# Patient Record
Sex: FEMALE | Race: WHITE | NOT HISPANIC OR LATINO | Employment: OTHER | URBAN - METROPOLITAN AREA
[De-identification: names, ages, dates, MRNs, and addresses within clinical notes are randomized per-mention and may not be internally consistent; named-entity substitution may affect disease eponyms.]

---

## 2017-01-12 ENCOUNTER — HOSPITAL ENCOUNTER (OUTPATIENT)
Facility: MEDICAL CENTER | Age: 62
End: 2017-01-12
Attending: FAMILY MEDICINE
Payer: COMMERCIAL

## 2017-01-12 ENCOUNTER — OFFICE VISIT (OUTPATIENT)
Dept: MEDICAL GROUP | Facility: PHYSICIAN GROUP | Age: 62
End: 2017-01-12
Payer: COMMERCIAL

## 2017-01-12 VITALS
HEIGHT: 67 IN | HEART RATE: 78 BPM | DIASTOLIC BLOOD PRESSURE: 68 MMHG | RESPIRATION RATE: 16 BRPM | SYSTOLIC BLOOD PRESSURE: 130 MMHG | TEMPERATURE: 98.6 F | BODY MASS INDEX: 36.41 KG/M2 | WEIGHT: 232 LBS | OXYGEN SATURATION: 95 %

## 2017-01-12 DIAGNOSIS — R10.9 RIGHT FLANK PAIN: ICD-10-CM

## 2017-01-12 DIAGNOSIS — E55.9 VITAMIN D INSUFFICIENCY: ICD-10-CM

## 2017-01-12 DIAGNOSIS — E66.9 OBESITY (BMI 35.0-39.9 WITHOUT COMORBIDITY): ICD-10-CM

## 2017-01-12 DIAGNOSIS — Z00.00 ROUTINE PHYSICAL EXAMINATION: ICD-10-CM

## 2017-01-12 DIAGNOSIS — R10.31 RLQ ABDOMINAL PAIN: ICD-10-CM

## 2017-01-12 DIAGNOSIS — E78.5 DYSLIPIDEMIA: ICD-10-CM

## 2017-01-12 DIAGNOSIS — E03.8 OTHER SPECIFIED HYPOTHYROIDISM: ICD-10-CM

## 2017-01-12 LAB
APPEARANCE UR: ABNORMAL
BILIRUB UR STRIP-MCNC: ABNORMAL MG/DL
COLOR UR AUTO: ABNORMAL
GLUCOSE UR STRIP.AUTO-MCNC: ABNORMAL MG/DL
KETONES UR STRIP.AUTO-MCNC: ABNORMAL MG/DL
LEUKOCYTE ESTERASE UR QL STRIP.AUTO: ABNORMAL
NITRITE UR QL STRIP.AUTO: ABNORMAL
PH UR STRIP.AUTO: 8.5 [PH] (ref 5–8)
PROT UR QL STRIP: ABNORMAL MG/DL
RBC UR QL AUTO: ABNORMAL
SP GR UR STRIP.AUTO: 1
UROBILINOGEN UR STRIP-MCNC: ABNORMAL MG/DL

## 2017-01-12 PROCEDURE — 87086 URINE CULTURE/COLONY COUNT: CPT

## 2017-01-12 PROCEDURE — 81002 URINALYSIS NONAUTO W/O SCOPE: CPT | Performed by: FAMILY MEDICINE

## 2017-01-12 PROCEDURE — 99396 PREV VISIT EST AGE 40-64: CPT | Performed by: FAMILY MEDICINE

## 2017-01-12 PROCEDURE — 99000 SPECIMEN HANDLING OFFICE-LAB: CPT | Performed by: FAMILY MEDICINE

## 2017-01-12 RX ORDER — SIMVASTATIN 20 MG
TABLET ORAL
Qty: 30 TAB | Refills: 5 | Status: SHIPPED | OUTPATIENT
Start: 2017-01-12 | End: 2017-07-31 | Stop reason: SDUPTHER

## 2017-01-12 RX ORDER — CIPROFLOXACIN 500 MG/1
500 TABLET, FILM COATED ORAL 2 TIMES DAILY
Qty: 14 TAB | Refills: 0 | Status: SHIPPED | OUTPATIENT
Start: 2017-01-12 | End: 2017-12-12

## 2017-01-12 RX ORDER — LEVOTHYROXINE SODIUM 0.1 MG/1
100 TABLET ORAL
Qty: 30 TAB | Refills: 5 | Status: SHIPPED | OUTPATIENT
Start: 2017-01-12 | End: 2017-07-19 | Stop reason: SDUPTHER

## 2017-01-12 ASSESSMENT — PATIENT HEALTH QUESTIONNAIRE - PHQ9: CLINICAL INTERPRETATION OF PHQ2 SCORE: 0

## 2017-01-12 NOTE — MR AVS SNAPSHOT
"        Melissa Escamilla   2017 2:00 PM   Office Visit   MRN: [id number]    Department:  [facility]   Dept Phone:  [phone]    Description:  Female : 1955   Provider:  Jennifer Pino M.D.           Reason for Visit     Low Back Pain radiates to RLQ       Allergies as of 2017     No Known Allergies      You were diagnosed with     Routine physical examination   [594244]       RLQ abdominal pain   [633633]       Right flank pain   [658423]       Dyslipidemia   [087655]       Other specified hypothyroidism   [5644391]       Vitamin D insufficiency   [452045]       Other specified hypothyroidism   [3770475]   stable: continue current medication; refill provided. monitor      Vital Signs     Blood Pressure Pulse Temperature Respirations Height Weight    130/68 mmHg 78 37 °C (98.6 °F) 16 1.702 m (5' 7\") 105.235 kg (232 lb)    Body Mass Index Oxygen Saturation Smoking Status             36.33 kg/m2 95% Current Some Day Smoker         Basic Information     Date Of Birth Sex Race Ethnicity Preferred Language    1955 Female White Non- English      Your appointments     2017  2:00 PM   Established Patient with Jennifer Pino M.D.   [facility] (--)    [address]  [address]  [place] [address]   [phone]           You will be receiving a confirmation call a few days before your appointment from our automated call confirmation system.            2017  7:00 AM   ANNUAL EXAM PREVENTATIVE with Jennifer Pino M.D.   [facility] (--)    [address]  [address]  [place] [address]   [phone]            2017  1:20 PM   Established Patient with Jennifer Pino M.D.   [facility] (--)    [address]  [address]  [place] [address]   [phone]           You will be receiving a confirmation call a few days before your appointment from our automated call confirmation system.              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   Unknown - Present    Dyslipidemia E78.5   Unknown - Present    Other specified hypothyroidism E03.8   12/9/2015 - Present    Dysuria R30.0   3/28/2016 - Present      Health Maintenance        Date Due Completion Dates    PAP SMEAR 8/5/2016 8/5/2013 (Done)    Override on 8/5/2013: Done    IMM INFLUENZA (1) 9/1/2016 9/19/2015, 9/19/2015, 10/22/2013    MAMMOGRAM 7/29/2017 7/29/2016, 7/22/2015, 7/21/2014, 6/21/2013, 5/18/2012, 12/27/2006, 11/23/2005, 10/5/2004    COLONOSCOPY 10/10/2022 10/10/2012 (Done)    Override on 10/10/2012: Done    IMM DTaP/Tdap/Td Vaccine (2 - Td) 4/22/2024 4/22/2014            Current Immunizations     Influenza TIV (IM) 10/22/2013    Influenza Vaccine Quad Inj (Pf) 9/12/2016, 9/19/2015 12:00 PM    Influenza Vaccine Quad Inj (Preserved) 9/19/2015    Tdap Vaccine 4/22/2014      Below and/or attached are the medications your provider expects you to take. Review all of your home medications and newly ordered medications with your provider and/or pharmacist. Follow medication instructions as directed by your provider and/or pharmacist. Please keep your medication list with you and share with your provider. Update the information when medications are discontinued, doses are changed, or new medications (including over-the-counter products) are added; and carry medication information at all times in the event of emergency situations     Allergies:  No Known Allergies          Medications  Valid as of: January 12, 2017 -  1:59 PM    Generic Name Brand Name Tablet Size Instructions for use    Cholecalciferol (Tab) Vitamin D-3 5000 UNITS Take  by mouth. 1 tab 2 x / week         Ciprofloxacin HCl (Tab) CIPRO 500 MG Take 1 Tab by mouth 2 times a day.        Levothyroxine Sodium (Tab) SYNTHROID 88 MCG Take 1 Tab by mouth every day.        Levothyroxine  Sodium (Tab) SYNTHROID 100 MCG Take 1 Tab by mouth Every morning on an empty stomach.        MetroNIDAZOLE (Tab) FLAGYL 500 MG Take 1 Tab by mouth 2 Times a Day.        Sertraline HCl (Tab) ZOLOFT 50 MG Take 1 Tab by mouth every day.        Simvastatin (Tab) ZOCOR 20 MG TAKE ONE TABLET BY MOUTH EVERY EVENING        .                 Medicines prescribed today were sent to:     [id number] - [place], [place] - [address]    [address] [place] [address]    Phone: [phone] Fax: [phone]    Open 24 Hours?: No      Medication refill instructions:       If your prescription bottle indicates you have medication refills left, it is not necessary to call your provider’s office. Please contact your pharmacy and they will refill your medication.    If your prescription bottle indicates you do not have any refills left, you may request refills at any time through one of the following ways: The online [employer] system (except Urgent Care), by calling your provider’s office, or by asking your pharmacy to contact your provider’s office with a refill request. Medication refills are processed only during regular business hours and may not be available until the next business day. Your provider may request additional information or to have a follow-up visit with you prior to refilling your medication.   *Please Note: Medication refills are assigned a new Rx number when refilled electronically. Your pharmacy may indicate that no refills were authorized even though a new prescription for the same medication is available at the pharmacy. Please request the medicine by name with the pharmacy before contacting your provider for a refill.        Your To Do List     Future Labs/Procedures Complete By Expires    CBC WITH DIFFERENTIAL  As directed 1/13/2018    COMP METABOLIC PANEL  As directed 1/13/2018    LIPID PROFILE  As directed 1/13/2018    TSH  As directed 1/13/2018    URINE CULTURE(NEW)  As directed 1/13/2018     VITAMIN D,25 HYDROXY  As directed 1/13/2018         JIT [employer]ola[employer] Access Code: Activation code not generated  Current [employer] Status: Active

## 2017-01-13 NOTE — PROGRESS NOTES
Subjective:      Melissa Escamilla is a 61 y.o. female who presents with Low Back Pain            HPI     Patient is here because she wants to have a physical exam. She was scheduled to have this physical exam done in 2 weeks but since because she is already here for an acute problem she would like it done today.    She is complaining right flank pain that has been ongoing for the last 2 weeks with radiation to the right lower quadrant. The pain is worse with movement. She denies any disturbances in urination or bowel movement. She denies any fever or chills. In March 2016 she was treated for UTI and at that time her symptom was dysuria. She had hematuria that lingered after the UTI was treated with antibiotics. We did a CAT scan of the abdomen and pelvis which did not show any evidence of stones or other abnormalities. The hematuria finally resolved spontaneously with follow-up urinalysis. Patient denies any vaginal discharge. She has not been sexually active for over a year. She denies seeing blood in the urine. She denies any trauma to the back and abdomen. She said the pain feels like she strained her muscles.    She also needs follow-up of her medical problems. She continues to take simvastatin for dyslipidemia without any side effects. Her last blood work was in 12/15 and came back all at [facility].    She continues to take her thyroid replacement for hypothyroidism. The last TSH was in 12/15 that came back adequately replaced.    She has vitamin D insufficiency for which he takes vitamin D supplementation regularly.    She said she has gained weight since last visit. Per our record she has a 2 pound weight gain. She has difficulty losing weight. She is asking about weight loss medications.    Past medical history, past surgical history, family history reviewed-no changes    Social history reviewed-no changes    Allergies reviewed-no changes    Medications reviewed-no changes      ROS     Review of systems as per  "history of present illness, the rest are negative.     Objective:     /68 mmHg  Pulse 78  Temp(Src) 37 °C (98.6 °F)  Resp 16  Ht 1.702 m (5' 7\")  Wt 105.235 kg (232 lb)  BMI 36.33 kg/m2  SpO2 95%     Physical Exam     Constitutional:  Alert, awake, oriented, not in distress    Skin:                 Warm, no rashes in visible areas    Head:               Atraumatic, normocephalic    Eyes:               Pupils equal, round and reactive to light, extraocular muscles movement                           intact, clear conjunctivae    Ears:               Tympanic membranes intact without evidence of infection    Nose:              No nasal discharge, no obstruction    Mouth:             No oral lesions    Throat:            Tonsils not enlarged, no exudates    Neck:              Trachea midline, supple, no lymphadenopathy, no thyromegaly    Lungs:             Clear to auscultation, no rales, no wheezes, no rhonchi    Heart:              Regular rate and rhythm, no murmur    Abdomen:       Good bowel sounds, soft, slightly tender right lower quadrant without any rebound tenderness, no CVA tenderness, no hepatosplenomegaly, no masses    Extremities:    No edema, no clubbing, no cyanosis    Psych:            Alert and oriented x3, normal affect    Neuro:            Cranial nerves intact, Motor strength 5/5 upper and lower extremities,                                 DTRs 2+, sensation intact to light touch, negative Romberg    Urine dipstick         Component Value Ref Range & Units Status     POC Color lt yellow Negative Final     POC Appearance hazy Negative Final     POC Leukocyte Esterase neg Negative Final     POC Nitrites neg Negative Final     POC Urobiligen neg Negative (0.2) mg/dL Final     POC Protein neg Negative mg/dL Final     POC Urine PH 8.5 (A) 5.0 - 8.0 Final     POC Blood TR Negative Final     POC Specific Gravity 1.005 <1.005 - >1.030 Final     POC Ketones neg Negative mg/dL Final     POC " Biliruben neg Negative mg/dL Final     POC Glucose neg Negative mg/dL Final         Last Resulted Time     Thu Jan 12, 2017  2:27 PM              Assessment/Plan:     1. Routine physical examination  Complete physical exam was done. She is up-to-date with her immunizations, mammogram, colonoscopy. She sees her gynecologist for GYN exam/Pap smear.  - LIPID PROFILE; Future  - COMP METABOLIC PANEL; Future  - CBC WITH DIFFERENTIAL; Future  - TSH; Future  - VITAMIN D,25 HYDROXY; Future    2. RLQ abdominal pain  She has trace blood in the urine. This may be an insignificant finding but this could also be UTI or kidney stone causing the pain. I offered CT of the abdomen and pelvis but she wants to hold off until after she takes antibiotics. She is worried about the expense. She is hoping that this would clear with antibiotics. He is to We will treat for possible infection pending urine culture results. If there is no true infection and if her pain persists after antibiotic treatment we will proceed with CT of the abdomen and pelvis for further evaluation. Advised increase by mouth fluids and rest. We will check blood work including CBC, CMP.  - POCT Urinalysis  - URINE CULTURE(NEW); Future  - LIPID PROFILE; Future  - COMP METABOLIC PANEL; Future  - CBC WITH DIFFERENTIAL; Future  - TSH; Future  - VITAMIN D,25 HYDROXY; Future  - ciprofloxacin (CIPRO) 500 MG Tab; Take 1 Tab by mouth 2 times a day.  Dispense: 14 Tab; Refill: 0    3. Right flank pain  Plan the same as #2.  - POCT Urinalysis  - URINE CULTURE(NEW); Future  - LIPID PROFILE; Future  - COMP METABOLIC PANEL; Future  - CBC WITH DIFFERENTIAL; Future  - TSH; Future  - VITAMIN D,25 HYDROXY; Future  - ciprofloxacin (CIPRO) 500 MG Tab; Take 1 Tab by mouth 2 times a day.  Dispense: 14 Tab; Refill: 0    4. Dyslipidemia  We will do follow-up lipid panel.  - LIPID PROFILE; Future  - COMP METABOLIC PANEL; Future  - CBC WITH DIFFERENTIAL; Future  - TSH; Future  - VITAMIN D,25  HYDROXY; Future  - simvastatin (ZOCOR) 20 MG Tab; TAKE ONE TABLET BY MOUTH EVERY EVENING  Dispense: 30 Tab; Refill: 5    5. Other specified hypothyroidism  We will do follow-up thyroid function tests. We will adjust the dose depending on results. Her thyroid may be underreplaced which can cause the weight gain.  - LIPID PROFILE; Future  - COMP METABOLIC PANEL; Future  - CBC WITH DIFFERENTIAL; Future  - TSH; Future  - VITAMIN D,25 HYDROXY; Future  - levothyroxine (SYNTHROID) 100 MCG Tab; Take 1 Tab by mouth Every morning on an empty stomach.  Dispense: 30 Tab; Refill: 5    6. Vitamin D insufficiency  We will do follow-up vitamin D level.  - LIPID PROFILE; Future  - COMP METABOLIC PANEL; Future  - CBC WITH DIFFERENTIAL; Future  - TSH; Future  - VITAMIN D,25 HYDROXY; Future    7. Obesity (BMI 35.0-39.9 without comorbidity) (HCC)  Discussed diet, exercise and weight loss. Discussed different weight loss medications. They are not covered by insurance and they can be expensive. Discussed referral to the dietitian. Discussed referral to you and our weight loss program. This time she has not decided which way to go. She will let me know if she decides in the future. We will also wait for the results of the blood work to see if her thyroid may be underreplaced so we can adjust the medication dose.  - Patient identified as having weight management issue.  Appropriate orders and counseling given.      Please note that this dictation was created using voice recognition software. I have worked with consultants from the vendor as well as technical experts from [employer] to optimize the interface. I have made every reasonable attempt to correct obvious errors, but I expect that there are errors of grammar and possibly content I did not discover before finalizing the note.

## 2017-01-14 LAB
BACTERIA UR CULT: NORMAL
SIGNIFICANT IND 70042: NORMAL
SITE SITE: NORMAL
SOURCE SOURCE: NORMAL

## 2017-01-16 ENCOUNTER — HOSPITAL ENCOUNTER (OUTPATIENT)
Dept: LAB | Facility: MEDICAL CENTER | Age: 62
End: 2017-01-16
Attending: FAMILY MEDICINE
Payer: COMMERCIAL

## 2017-01-16 DIAGNOSIS — E03.8 OTHER SPECIFIED HYPOTHYROIDISM: ICD-10-CM

## 2017-01-16 DIAGNOSIS — E55.9 VITAMIN D INSUFFICIENCY: ICD-10-CM

## 2017-01-16 DIAGNOSIS — E78.5 DYSLIPIDEMIA: ICD-10-CM

## 2017-01-16 DIAGNOSIS — Z00.00 ROUTINE PHYSICAL EXAMINATION: ICD-10-CM

## 2017-01-16 DIAGNOSIS — R10.9 RIGHT FLANK PAIN: ICD-10-CM

## 2017-01-16 DIAGNOSIS — R10.31 RLQ ABDOMINAL PAIN: ICD-10-CM

## 2017-01-16 LAB
25(OH)D3 SERPL-MCNC: 27 NG/ML (ref 30–100)
ALBUMIN SERPL BCP-MCNC: 4.6 G/DL (ref 3.2–4.9)
ALBUMIN/GLOB SERPL: 1.8 G/DL
ALP SERPL-CCNC: 60 U/L (ref 30–99)
ALT SERPL-CCNC: 14 U/L (ref 2–50)
ANION GAP SERPL CALC-SCNC: 9 MMOL/L (ref 0–11.9)
AST SERPL-CCNC: 14 U/L (ref 12–45)
BASOPHILS # BLD AUTO: 0.01 K/UL (ref 0–0.12)
BASOPHILS NFR BLD AUTO: 0.3 % (ref 0–1.8)
BILIRUB SERPL-MCNC: 0.4 MG/DL (ref 0.1–1.5)
BUN SERPL-MCNC: 19 MG/DL (ref 8–22)
CALCIUM SERPL-MCNC: 9.3 MG/DL (ref 8.5–10.5)
CHLORIDE SERPL-SCNC: 105 MMOL/L (ref 96–112)
CHOLEST SERPL-MCNC: 179 MG/DL (ref 100–199)
CO2 SERPL-SCNC: 25 MMOL/L (ref 20–33)
CREAT SERPL-MCNC: 0.91 MG/DL (ref 0.5–1.4)
EOSINOPHIL # BLD: 0.08 K/UL (ref 0–0.51)
EOSINOPHIL NFR BLD AUTO: 2.1 % (ref 0–6.9)
ERYTHROCYTE [DISTWIDTH] IN BLOOD BY AUTOMATED COUNT: 44 FL (ref 35.9–50)
GLOBULIN SER CALC-MCNC: 2.5 G/DL (ref 1.9–3.5)
GLUCOSE SERPL-MCNC: 107 MG/DL (ref 65–99)
HCT VFR BLD AUTO: 40.8 % (ref 37–47)
HDLC SERPL-MCNC: 54 MG/DL
HGB BLD-MCNC: 13.7 G/DL (ref 12–16)
IMM GRANULOCYTES # BLD AUTO: 0.01 K/UL (ref 0–0.11)
IMM GRANULOCYTES NFR BLD AUTO: 0.3 % (ref 0–0.9)
LDLC SERPL CALC-MCNC: 100 MG/DL
LYMPHOCYTES # BLD: 1.12 K/UL (ref 1–4.8)
LYMPHOCYTES NFR BLD AUTO: 30 % (ref 22–41)
MCH RBC QN AUTO: 31.2 PG (ref 27–33)
MCHC RBC AUTO-ENTMCNC: 33.6 G/DL (ref 33.6–35)
MCV RBC AUTO: 92.9 FL (ref 81.4–97.8)
MONOCYTES # BLD: 0.33 K/UL (ref 0–0.85)
MONOCYTES NFR BLD AUTO: 8.8 % (ref 0–13.4)
NEUTROPHILS # BLD: 2.18 K/UL (ref 2–7.15)
NEUTROPHILS NFR BLD AUTO: 58.5 % (ref 44–72)
NRBC # BLD AUTO: 0 K/UL
NRBC BLD-RTO: 0 /100 WBC
PLATELET # BLD AUTO: 236 K/UL (ref 164–446)
PMV BLD AUTO: 11.3 FL (ref 9–12.9)
POTASSIUM SERPL-SCNC: 4.1 MMOL/L (ref 3.6–5.5)
PROT SERPL-MCNC: 7.1 G/DL (ref 6–8.2)
RBC # BLD AUTO: 4.39 M/UL (ref 4.2–5.4)
SODIUM SERPL-SCNC: 139 MMOL/L (ref 135–145)
TRIGL SERPL-MCNC: 124 MG/DL (ref 0–149)
TSH SERPL DL<=0.005 MIU/L-ACNC: 3.8 UIU/ML (ref 0.3–3.7)
WBC # BLD AUTO: 3.7 K/UL (ref 4.8–10.8)

## 2017-01-16 PROCEDURE — 82306 VITAMIN D 25 HYDROXY: CPT

## 2017-01-16 PROCEDURE — 80061 LIPID PANEL: CPT

## 2017-01-16 PROCEDURE — 80053 COMPREHEN METABOLIC PANEL: CPT

## 2017-01-16 PROCEDURE — 84443 ASSAY THYROID STIM HORMONE: CPT

## 2017-01-16 PROCEDURE — 36415 COLL VENOUS BLD VENIPUNCTURE: CPT

## 2017-01-16 PROCEDURE — 85025 COMPLETE CBC W/AUTO DIFF WBC: CPT

## 2017-01-18 DIAGNOSIS — R73.01 IMPAIRED FASTING BLOOD SUGAR: ICD-10-CM

## 2017-01-18 DIAGNOSIS — E78.5 DYSLIPIDEMIA: ICD-10-CM

## 2017-01-18 DIAGNOSIS — D70.9 NEUTROPENIA, UNSPECIFIED TYPE (HCC): ICD-10-CM

## 2017-01-18 DIAGNOSIS — E03.8 OTHER SPECIFIED HYPOTHYROIDISM: ICD-10-CM

## 2017-01-18 DIAGNOSIS — E55.9 VITAMIN D DEFICIENCY: ICD-10-CM

## 2017-06-12 ENCOUNTER — TELEPHONE (OUTPATIENT)
Dept: MEDICAL GROUP | Facility: PHYSICIAN GROUP | Age: 62
End: 2017-06-12

## 2017-06-12 NOTE — TELEPHONE ENCOUNTER
ESTABLISHED PATIENT PRE-VISIT PLANNING     Note: Patient will not be contacted if there is no indication to call.     1.  Reviewed notes from the last few office visits within the medical group: Yes    2.  If any orders were placed at last visit or intended to be done for this visit (i.e. 6 mos follow-up), do we have Results/Consult Notes?        •  Labs - Labs ordered, completed and results are in chart.       •  Imaging - Imaging was not ordered at last office visit.       •  Referrals - No referrals were ordered at last office visit.    3. Is this appointment scheduled as a Hospital Follow-Up? No    4.  Immunizations were updated in [employer] using WebIZ?: Yes       •  Web Iz Recommendations: HEPATITIS A  MMR  TDAP VARICELLA (Chicken Pox)     5.  Patient is due for the following Health Maintenance Topics:   Health Maintenance Due   Topic Date Due   • PAP SMEAR  08/05/2016       - Patient has completed FLU and TDAP Immunization(s) per WebIZ. Chart has been updated.    6.  Patient was NOT informed to arrive 15 min prior to their scheduled appointment and bring in their medication bottles.

## 2017-06-13 ENCOUNTER — OFFICE VISIT (OUTPATIENT)
Dept: MEDICAL GROUP | Facility: PHYSICIAN GROUP | Age: 62
End: 2017-06-13
Payer: COMMERCIAL

## 2017-06-13 VITALS
WEIGHT: 223 LBS | HEART RATE: 70 BPM | TEMPERATURE: 99.2 F | OXYGEN SATURATION: 97 % | RESPIRATION RATE: 16 BRPM | SYSTOLIC BLOOD PRESSURE: 110 MMHG | BODY MASS INDEX: 35 KG/M2 | HEIGHT: 67 IN | DIASTOLIC BLOOD PRESSURE: 66 MMHG

## 2017-06-13 DIAGNOSIS — J04.0 LARYNGITIS: ICD-10-CM

## 2017-06-13 DIAGNOSIS — E66.9 OBESITY (BMI 30-39.9): ICD-10-CM

## 2017-06-13 DIAGNOSIS — J01.90 ACUTE SINUSITIS, RECURRENCE NOT SPECIFIED, UNSPECIFIED LOCATION: ICD-10-CM

## 2017-06-13 PROCEDURE — 99213 OFFICE O/P EST LOW 20 MIN: CPT | Performed by: FAMILY MEDICINE

## 2017-06-13 RX ORDER — AMOXICILLIN AND CLAVULANATE POTASSIUM 875; 125 MG/1; MG/1
1 TABLET, FILM COATED ORAL 2 TIMES DAILY
Qty: 20 TAB | Refills: 0 | Status: SHIPPED | OUTPATIENT
Start: 2017-06-13 | End: 2017-12-12

## 2017-06-13 NOTE — PROGRESS NOTES
"Subjective:      Melissa Escamilla is a 61 y.o. female who presents with Laryngitis            HPI  Patient is here because she has been having nasal congestion with nasal discharge with yellowish color for the last 10 days. Symptoms got worse 3 days ago with hoarseness of voice/loss of voice. She said initially her upper teeth were hurting when her symptoms first started. She denies shortness of breath, fever, chills. She has been having headache and soreness in her throat. She has been having sweating. She says she just retired and is looking forward to it.    Past medical history, past surgical history, family history reviewed-no changes    Social history reviewed-no changes    Allergies reviewed-no changes    Medications reviewed-no changes      ROS     Review of systems as per history of present illness, the rest are negative.       Objective:     /66 mmHg  Pulse 70  Temp(Src) 37.3 °C (99.2 °F)  Resp 16  Ht 1.702 m (5' 7.01\")  Wt 101.152 kg (223 lb)  BMI 34.92 kg/m2  SpO2 97%     Physical Exam     Examined alert, awake, oriented, not in distress    Ears-both tympanic membranes intact bilaterally without evidence of infection  Nose-enlarged and erythematous turbinates with near total obstruction, no discharge, nontender frontal and maxillary sinuses  Throat-no erythema, tonsils not enlarged, no exudates  Neck-supple, no lymphadenopathy, no thyromegaly  Lungs-clear to auscultation, no rales, no wheezes  Heart-regular rate and rhythm, no murmur  Extremities-no edema, clubbing, cyanosis            Assessment/Plan:     1. Acute sinusitis, recurrence not specified, unspecified location  Most likely she has good sinusitis causing yellowish nasal discharge, postnasal drip, laryngitis. I will treat with Augmentin. Advised over-the-counter Mucinex D. Advised increase by mouth fluids and rest. Return if there is no improvement or if there is worsening of symptoms.    2. Laryngitis  Advised warm saline gargle " and to rest the voice.    3. Obesity (BMI 30-39.9)  Discussed diet, exercise and weight loss.  - Patient identified as having weight management issue.  Appropriate orders and counseling given.      Keep appointment as scheduled in July.      Please note that this dictation was created using voice recognition software. I have worked with consultants from the vendor as well as technical experts from [facility] to optimize the interface. I have made every reasonable attempt to correct obvious errors, but I expect that there are errors of grammar and possibly content I did not discover before finalizing the note.

## 2017-06-13 NOTE — MR AVS SNAPSHOT
"        Melissa Escamilla   2017 10:20 AM   Office Visit   MRN: [id number]    Department:  [facility]   Dept Phone:  [phone]    Description:  Female : 1955   Provider:  Jennifer Pino M.D.           Reason for Visit     Laryngitis siunce Saturday       Allergies as of 2017     No Known Allergies      You were diagnosed with     Acute sinusitis, recurrence not specified, unspecified location   [[id number]]       Laryngitis   [[id number]]         Vital Signs     Blood Pressure Pulse Temperature Respirations Height Weight    110/66 mmHg 70 37.3 °C (99.2 °F) 16 1.702 m (5' 7.01\") 101.152 kg (223 lb)    Body Mass Index Oxygen Saturation Smoking Status             34.92 kg/m2 97% Current Some Day Smoker         Basic Information     Date Of Birth Sex Race Ethnicity Preferred Language    1955 Female White Non- English      Your appointments     2017  1:20 PM   Established Patient with Jennifer Pino M.D.   [facility] (--)    [address]  [address]  [facility] [address] [phone]           You will be receiving a confirmation call a few days before your appointment from our automated call confirmation system.              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   Unknown - Present    Dyslipidemia E78.5   Unknown - Present    Other specified hypothyroidism E03.8   2015 - Present    Dysuria R30.0   3/28/2016 - Present    Obesity (BMI 35.0-39.9 without comorbidity) ([facility]) E66.9   2017 - Present      Health Maintenance        Date Due Completion Dates    PAP SMEAR 2016 (Done)    Override on 2013: Done    MAMMOGRAM 2017, 2015, 2014, 2013, 2012, 2006, 2005, 10/5/2004    COLONOSCOPY 10/10/2022 10/10/2012 (Done)    Override on 10/10/2012: Done    IMM DTaP/Tdap/Td " Vaccine (2 - Td) 4/22/2024 4/22/2014            Current Immunizations     Influenza TIV (IM) 10/22/2013    Influenza Vaccine Quad Inj (Pf) 9/12/2016, 9/19/2015 12:00 PM, 10/15/2010    Influenza Vaccine Quad Inj (Preserved) 9/19/2015    Tdap Vaccine 4/22/2014      Below and/or attached are the medications your provider expects you to take. Review all of your home medications and newly ordered medications with your provider and/or pharmacist. Follow medication instructions as directed by your provider and/or pharmacist. Please keep your medication list with you and share with your provider. Update the information when medications are discontinued, doses are changed, or new medications (including over-the-counter products) are added; and carry medication information at all times in the event of emergency situations     Allergies:  No Known Allergies          Medications  Valid as of: June 13, 2017 - 11:55 AM    Generic Name Brand Name Tablet Size Instructions for use    Amoxicillin-Pot Clavulanate (Tab) AUGMENTIN 875-125 MG Take 1 Tab by mouth 2 times a day.        Cholecalciferol (Tab) Vitamin D-3 5000 UNITS Take  by mouth. 1 tab 2 x / week         Ciprofloxacin HCl (Tab) CIPRO 500 MG Take 1 Tab by mouth 2 times a day.        Levothyroxine Sodium (Tab) SYNTHROID 88 MCG Take 1 Tab by mouth every day.        Levothyroxine Sodium (Tab) SYNTHROID 100 MCG Take 1 Tab by mouth Every morning on an empty stomach.        MetroNIDAZOLE (Tab) FLAGYL 500 MG Take 1 Tab by mouth 2 Times a Day.        Sertraline HCl (Tab) ZOLOFT 50 MG TAKE ONE TABLET BY MOUTH EVERY DAY        Simvastatin (Tab) ZOCOR 20 MG TAKE ONE TABLET BY MOUTH EVERY EVENING        .                 Medicines prescribed today were sent to:     [id number] - [place], [place] - [address]    [address] [place] [address]    Phone: [phone] Fax: [phone]    Open 24 Hours?: No      Medication refill instructions:       If your prescription bottle  indicates you have medication refills left, it is not necessary to call your provider’s office. Please contact your pharmacy and they will refill your medication.    If your prescription bottle indicates you do not have any refills left, you may request refills at any time through one of the following ways: The online [employer] system (except Urgent Care), by calling your provider’s office, or by asking your pharmacy to contact your provider’s office with a refill request. Medication refills are processed only during regular business hours and may not be available until the next business day. Your provider may request additional information or to have a follow-up visit with you prior to refilling your medication.   *Please Note: Medication refills are assigned a new Rx number when refilled electronically. Your pharmacy may indicate that no refills were authorized even though a new prescription for the same medication is available at the pharmacy. Please request the medicine by name with the pharmacy before contacting your provider for a refill.           [employer] Access Code: Activation code not generated  Current [employer] Status: Active          Quit Tobacco Information     Do you want to quit using tobacco?    Quitting tobacco decreases risks of cancer, heart and lung disease, increases life expectancy, improves sense of taste and smell, and increases spending money, among other benefits.    If you are thinking about quitting, we can help.  • [facility] Quit Tobacco Program: [phone]  o Program occurs weekly for four weeks and includes pharmacist consultation on products to support quitting smoking or chewing tobacco. A provider referral is needed for pharmacist consultation.  • Tobacco Users Help Hotline: 7-097-QUIT-NOW (859-7588) or https://[place].quitlogix.org/  o Free, confidential telephone and online coaching for [place] residents. Sessions are designed on a schedule that is convenient for you. Eligible clients  receive free nicotine replacement therapy.  • Nationally: www.smokefree.gov  o Information and professional assistance to support both immediate and long-term needs as you become, and remain, a non-smoker. Smokefree.gov allows you to choose the help that best fits your needs.

## 2017-07-19 RX ORDER — LEVOTHYROXINE SODIUM 0.1 MG/1
100 TABLET ORAL
Qty: 30 TAB | Refills: 5 | Status: SHIPPED | OUTPATIENT
Start: 2017-07-19 | End: 2018-02-08 | Stop reason: SDUPTHER

## 2017-07-26 ENCOUNTER — HOSPITAL ENCOUNTER (OUTPATIENT)
Dept: LAB | Facility: MEDICAL CENTER | Age: 62
End: 2017-07-26
Attending: FAMILY MEDICINE
Payer: COMMERCIAL

## 2017-07-26 DIAGNOSIS — E03.8 OTHER SPECIFIED HYPOTHYROIDISM: ICD-10-CM

## 2017-07-26 DIAGNOSIS — E55.9 VITAMIN D DEFICIENCY: ICD-10-CM

## 2017-07-26 DIAGNOSIS — R73.01 IMPAIRED FASTING BLOOD SUGAR: ICD-10-CM

## 2017-07-26 DIAGNOSIS — D70.9 NEUTROPENIA, UNSPECIFIED TYPE (HCC): ICD-10-CM

## 2017-07-26 DIAGNOSIS — E78.5 DYSLIPIDEMIA: ICD-10-CM

## 2017-07-26 LAB
25(OH)D3 SERPL-MCNC: 28 NG/ML (ref 30–100)
ALBUMIN SERPL BCP-MCNC: 4.2 G/DL (ref 3.2–4.9)
ALBUMIN/GLOB SERPL: 1.6 G/DL
ALP SERPL-CCNC: 59 U/L (ref 30–99)
ALT SERPL-CCNC: 14 U/L (ref 2–50)
ANION GAP SERPL CALC-SCNC: 6 MMOL/L (ref 0–11.9)
AST SERPL-CCNC: 15 U/L (ref 12–45)
BASOPHILS # BLD AUTO: 0.3 % (ref 0–1.8)
BASOPHILS # BLD: 0.01 K/UL (ref 0–0.12)
BILIRUB SERPL-MCNC: 0.3 MG/DL (ref 0.1–1.5)
BUN SERPL-MCNC: 25 MG/DL (ref 8–22)
CALCIUM SERPL-MCNC: 9.2 MG/DL (ref 8.5–10.5)
CHLORIDE SERPL-SCNC: 112 MMOL/L (ref 96–112)
CHOLEST SERPL-MCNC: 164 MG/DL (ref 100–199)
CO2 SERPL-SCNC: 22 MMOL/L (ref 20–33)
CREAT SERPL-MCNC: 0.86 MG/DL (ref 0.5–1.4)
EOSINOPHIL # BLD AUTO: 0.1 K/UL (ref 0–0.51)
EOSINOPHIL NFR BLD: 2.7 % (ref 0–6.9)
ERYTHROCYTE [DISTWIDTH] IN BLOOD BY AUTOMATED COUNT: 44.4 FL (ref 35.9–50)
EST. AVERAGE GLUCOSE BLD GHB EST-MCNC: 111 MG/DL
GFR SERPL CREATININE-BSD FRML MDRD: >60 ML/MIN/1.73 M 2
GLOBULIN SER CALC-MCNC: 2.6 G/DL (ref 1.9–3.5)
GLUCOSE SERPL-MCNC: 99 MG/DL (ref 65–99)
HBA1C MFR BLD: 5.5 % (ref 0–5.6)
HCT VFR BLD AUTO: 39.7 % (ref 37–47)
HDLC SERPL-MCNC: 46 MG/DL
HGB BLD-MCNC: 12.7 G/DL (ref 12–16)
IMM GRANULOCYTES # BLD AUTO: 0.01 K/UL (ref 0–0.11)
IMM GRANULOCYTES NFR BLD AUTO: 0.3 % (ref 0–0.9)
LDLC SERPL CALC-MCNC: 102 MG/DL
LYMPHOCYTES # BLD AUTO: 1.48 K/UL (ref 1–4.8)
LYMPHOCYTES NFR BLD: 40.2 % (ref 22–41)
MCH RBC QN AUTO: 30 PG (ref 27–33)
MCHC RBC AUTO-ENTMCNC: 32 G/DL (ref 33.6–35)
MCV RBC AUTO: 93.9 FL (ref 81.4–97.8)
MONOCYTES # BLD AUTO: 0.35 K/UL (ref 0–0.85)
MONOCYTES NFR BLD AUTO: 9.5 % (ref 0–13.4)
NEUTROPHILS # BLD AUTO: 1.73 K/UL (ref 2–7.15)
NEUTROPHILS NFR BLD: 47 % (ref 44–72)
NRBC # BLD AUTO: 0 K/UL
NRBC BLD AUTO-RTO: 0 /100 WBC
PLATELET # BLD AUTO: 243 K/UL (ref 164–446)
PMV BLD AUTO: 11.6 FL (ref 9–12.9)
POTASSIUM SERPL-SCNC: 4.3 MMOL/L (ref 3.6–5.5)
PROT SERPL-MCNC: 6.8 G/DL (ref 6–8.2)
RBC # BLD AUTO: 4.23 M/UL (ref 4.2–5.4)
SODIUM SERPL-SCNC: 140 MMOL/L (ref 135–145)
TRIGL SERPL-MCNC: 80 MG/DL (ref 0–149)
TSH SERPL DL<=0.005 MIU/L-ACNC: 2.16 UIU/ML (ref 0.3–3.7)
WBC # BLD AUTO: 3.7 K/UL (ref 4.8–10.8)

## 2017-07-26 PROCEDURE — 80061 LIPID PANEL: CPT

## 2017-07-26 PROCEDURE — 83036 HEMOGLOBIN GLYCOSYLATED A1C: CPT

## 2017-07-26 PROCEDURE — 85025 COMPLETE CBC W/AUTO DIFF WBC: CPT

## 2017-07-26 PROCEDURE — 36415 COLL VENOUS BLD VENIPUNCTURE: CPT

## 2017-07-26 PROCEDURE — 82306 VITAMIN D 25 HYDROXY: CPT

## 2017-07-26 PROCEDURE — 84443 ASSAY THYROID STIM HORMONE: CPT

## 2017-07-26 PROCEDURE — 80053 COMPREHEN METABOLIC PANEL: CPT

## 2017-07-31 ENCOUNTER — OFFICE VISIT (OUTPATIENT)
Dept: MEDICAL GROUP | Facility: PHYSICIAN GROUP | Age: 62
End: 2017-07-31
Payer: COMMERCIAL

## 2017-07-31 VITALS
HEIGHT: 67 IN | HEART RATE: 70 BPM | RESPIRATION RATE: 16 BRPM | WEIGHT: 224 LBS | OXYGEN SATURATION: 95 % | BODY MASS INDEX: 35.16 KG/M2 | DIASTOLIC BLOOD PRESSURE: 62 MMHG | SYSTOLIC BLOOD PRESSURE: 120 MMHG | TEMPERATURE: 98.6 F

## 2017-07-31 DIAGNOSIS — N95.1 SYMPTOMATIC MENOPAUSAL OR FEMALE CLIMACTERIC STATES: ICD-10-CM

## 2017-07-31 DIAGNOSIS — E55.9 VITAMIN D DEFICIENCY: ICD-10-CM

## 2017-07-31 DIAGNOSIS — E03.9 HYPOTHYROIDISM, UNSPECIFIED TYPE: ICD-10-CM

## 2017-07-31 DIAGNOSIS — E78.5 DYSLIPIDEMIA: ICD-10-CM

## 2017-07-31 DIAGNOSIS — D70.9 NEUTROPENIA, UNSPECIFIED TYPE (HCC): ICD-10-CM

## 2017-07-31 PROCEDURE — 99214 OFFICE O/P EST MOD 30 MIN: CPT | Performed by: FAMILY MEDICINE

## 2017-07-31 RX ORDER — SIMVASTATIN 20 MG
TABLET ORAL
Qty: 30 TAB | Refills: 5 | Status: SHIPPED | OUTPATIENT
Start: 2017-07-31 | End: 2018-07-05 | Stop reason: SDUPTHER

## 2017-07-31 ASSESSMENT — PAIN SCALES - GENERAL: PAINLEVEL: NO PAIN

## 2017-07-31 NOTE — MR AVS SNAPSHOT
"        Melissa Escamilla   2017 1:00 PM   Office Visit   MRN: [id number]    Department:  [facility]   Dept Phone:  [phone]    Description:  Female : 1955   Provider:  Jennifer Pino M.D.           Reason for Visit     Results labs       Allergies as of 2017     No Known Allergies      You were diagnosed with     Neutropenia, unspecified type (CMS-[facility])   [2272153]       Dyslipidemia   [108878]       Hypothyroidism, unspecified type   [9643025]       Vitamin D deficiency   [8357961]       Symptomatic menopausal or female climacteric states   [627.2.ICD-9-CM]         Vital Signs     Blood Pressure Pulse Temperature Respirations Height Weight    120/62 mmHg 70 37 °C (98.6 °F) 16 1.702 m (5' 7.01\") 101.606 kg (224 lb)    Body Mass Index Oxygen Saturation Breastfeeding? Smoking Status          35.08 kg/m2 95% No Current Some Day Smoker        Basic Information     Date Of Birth Sex Race Ethnicity Preferred Language    1955 Female White Non- English      Your appointments     2018 10:00 AM   Established Patient with Jennifer Pino M.D.   [facility] (--)    [address]  [address]  [facility] [address] [phone]           You will be receiving a confirmation call a few days before your appointment from our automated call confirmation system.              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   Unknown - Present    Dyslipidemia E78.5   Unknown - Present    Other specified hypothyroidism E03.8   2015 - Present    Dysuria R30.0   3/28/2016 - Present    Obesity (BMI 35.0-39.9 without comorbidity) (HCC) E66.9   2017 - Present    Obesity (BMI 30-39.9) E66.9   2017 - Present      Health Maintenance        Date Due Completion Dates    PAP SMEAR 2016 (Done)    Override on 2013: Done    IMM ZOSTER " VACCINE 7/5/2017 ---    MAMMOGRAM 7/29/2017 7/29/2016, 7/22/2015, 7/21/2014, 6/21/2013, 5/18/2012, 12/27/2006, 11/23/2005, 10/5/2004    IMM INFLUENZA (1) 9/1/2017 9/12/2016, 9/19/2015, 9/19/2015, 10/22/2013, 10/15/2010    COLONOSCOPY 10/10/2022 10/10/2012 (Done)    Override on 10/10/2012: Done    IMM DTaP/Tdap/Td Vaccine (2 - Td) 4/22/2024 4/22/2014            Current Immunizations     Influenza TIV (IM) 10/22/2013    Influenza Vaccine Quad Inj (Pf) 9/12/2016, 9/19/2015 12:00 PM, 10/15/2010    Influenza Vaccine Quad Inj (Preserved) 9/19/2015    Tdap Vaccine 4/22/2014      Below and/or attached are the medications your provider expects you to take. Review all of your home medications and newly ordered medications with your provider and/or pharmacist. Follow medication instructions as directed by your provider and/or pharmacist. Please keep your medication list with you and share with your provider. Update the information when medications are discontinued, doses are changed, or new medications (including over-the-counter products) are added; and carry medication information at all times in the event of emergency situations     Allergies:  No Known Allergies          Medications  Valid as of: July 31, 2017 -  1:36 PM    Generic Name Brand Name Tablet Size Instructions for use    Amoxicillin-Pot Clavulanate (Tab) AUGMENTIN 875-125 MG Take 1 Tab by mouth 2 times a day.        Cholecalciferol (Tab) Vitamin D-3 5000 UNITS Take  by mouth. 1 tab 2 x / week         Ciprofloxacin HCl (Tab) CIPRO 500 MG Take 1 Tab by mouth 2 times a day.        Levothyroxine Sodium (Tab) SYNTHROID 88 MCG Take 1 Tab by mouth every day.        Levothyroxine Sodium (Tab) SYNTHROID 100 MCG Take 1 Tab by mouth Every morning on an empty stomach.        MetroNIDAZOLE (Tab) FLAGYL 500 MG Take 1 Tab by mouth 2 Times a Day.        Sertraline HCl (Tab) ZOLOFT 50 MG Take 1 Tab by mouth every day.        Simvastatin (Tab) ZOCOR 20 MG TAKE ONE TABLET BY  MOUTH EVERY EVENING        .                 Medicines prescribed today were sent to:     [id number] - [place], [place] - [address]    [address] [place] [address]    Phone: [phone] Fax: [phone]    Open 24 Hours?: No      Medication refill instructions:       If your prescription bottle indicates you have medication refills left, it is not necessary to call your provider’s office. Please contact your pharmacy and they will refill your medication.    If your prescription bottle indicates you do not have any refills left, you may request refills at any time through one of the following ways: The online [employer] system (except Urgent Care), by calling your provider’s office, or by asking your pharmacy to contact your provider’s office with a refill request. Medication refills are processed only during regular business hours and may not be available until the next business day. Your provider may request additional information or to have a follow-up visit with you prior to refilling your medication.   *Please Note: Medication refills are assigned a new Rx number when refilled electronically. Your pharmacy may indicate that no refills were authorized even though a new prescription for the same medication is available at the pharmacy. Please request the medicine by name with the pharmacy before contacting your provider for a refill.        Your To Do List     Future Labs/Procedures Complete By Expires    CBC WITH DIFFERENTIAL  As directed 8/1/2018    COMP METABOLIC PANEL  As directed 8/1/2018    LIPID PROFILE  As directed 8/1/2018    TSH  As directed 8/1/2018    VITAMIN D,25 HYDROXY  As directed 8/1/2018      Referral     A referral request has been sent to our patient care coordination department. Please allow 3-5 business days for us to process this request and contact you either by phone or mail. If you do not hear from us by the 5th business day, please call us at [phone].            [employer] Access Code: Activation code not generated  Current Agency Systems Status: Active          Quit Tobacco Information     Do you want to quit using tobacco?    Quitting tobacco decreases risks of cancer, heart and lung disease, increases life expectancy, improves sense of taste and smell, and increases spending money, among other benefits.    If you are thinking about quitting, we can help.  • [facility] Quit Tobacco Program: [phone]  o Program occurs weekly for four weeks and includes pharmacist consultation on products to support quitting smoking or chewing tobacco. A provider referral is needed for pharmacist consultation.  • Tobacco Users Help Hotline: 9-915-QUIT-NOW (420-8868) or https://[place].quitlogix.org/  o Free, confidential telephone and online coaching for [place] residents. Sessions are designed on a schedule that is convenient for you. Eligible clients receive free nicotine replacement therapy.  • Nationally: www.smokefree.gov  o Information and professional assistance to support both immediate and long-term needs as you become, and remain, a non-smoker. Smokefree.gov allows you to choose the help that best fits your needs.

## 2017-08-01 NOTE — PROGRESS NOTES
"Subjective:      Melissa Escamilla is a 62 y.o. female who presents with Results            HPI     Patient is here for follow-up of her medical problems.    In terms of her dyslipidemia, she continues to take simvastatin without any myalgias. Blood work was done before his visit.    She continues a tight thyroid replacement for hypothyroidism.    For her vitamin D deficiency, she continues to take vitamin D supplementation.    She continues to take sertraline for menopausal symptoms. She has been on this for a while with good results. This also worked well when she was going to stress during her divorce. She feels that she is ready to come off from the medication.    Past medical history, past surgical history, family history reviewed-no changes    Social history reviewed-no changes    Allergies reviewed-no changes    Medications reviewed-no changes    ROS       Objective:     /62 mmHg  Pulse 70  Temp(Src) 37 °C (98.6 °F)  Resp 16  Ht 1.702 m (5' 7.01\")  Wt 101.606 kg (224 lb)  BMI 35.08 kg/m2  SpO2 95%  Breastfeeding? No     Physical Exam     Examined alert, awake, oriented, not in distress    Neck-supple, no lymphadenopathy, no thyromegaly  Lungs-clear to auscultation, no rales, no wheezes  Heart-regular rate and rhythm, no murmur  Extremities-no edema, clubbing, cyanosis          Hospital Outpatient Visit on 07/26/2017   Component Date Value   • TSH 07/26/2017 2.160    • Cholesterol,Tot 07/26/2017 164    • Triglycerides 07/26/2017 80    • HDL 07/26/2017 46    • LDL 07/26/2017 102*   • Sodium 07/26/2017 140    • Potassium 07/26/2017 4.3    • Chloride 07/26/2017 112    • Co2 07/26/2017 22    • Anion Gap 07/26/2017 6.0    • Glucose 07/26/2017 99    • Bun 07/26/2017 25*   • Creatinine 07/26/2017 0.86    • Calcium 07/26/2017 9.2    • AST(SGOT) 07/26/2017 15    • ALT(SGPT) 07/26/2017 14    • Alkaline Phosphatase 07/26/2017 59    • Total Bilirubin 07/26/2017 0.3    • Albumin 07/26/2017 4.2    • Total " Protein 07/26/2017 6.8    • Globulin 07/26/2017 2.6    • A-G Ratio 07/26/2017 1.6    • Glycohemoglobin 07/26/2017 5.5    • Est Avg Glucose 07/26/2017 111    • 25-Hydroxy   Vitamin D 25 07/26/2017 28*   • WBC 07/26/2017 3.7*previously 3.7    • RBC 07/26/2017 4.23    • Hemoglobin 07/26/2017 12.7    • Hematocrit 07/26/2017 39.7    • MCV 07/26/2017 93.9    • MCH 07/26/2017 30.0    • MCHC 07/26/2017 32.0*   • RDW 07/26/2017 44.4    • Platelet Count 07/26/2017 243    • MPV 07/26/2017 11.6    • Neutrophils-Polys 07/26/2017 47.00    • Lymphocytes 07/26/2017 40.20    • Monocytes 07/26/2017 9.50    • Eosinophils 07/26/2017 2.70    • Basophils 07/26/2017 0.30    • Immature Granulocytes 07/26/2017 0.30    • Nucleated RBC 07/26/2017 0.00    • Neutrophils (Absolute) 07/26/2017 1.73*   • Lymphs (Absolute) 07/26/2017 1.48    • Monos (Absolute) 07/26/2017 0.35    • Eos (Absolute) 07/26/2017 0.10    • Baso (Absolute) 07/26/2017 0.01    • Immature Granulocytes (a* 07/26/2017 0.01    • NRBC (Absolute) 07/26/2017 0.00    • GFR If  07/26/2017 >60    • GFR If Non  Ameri* 07/26/2017 >60      Assessment/Plan:     1. Neutropenia, unspecified type (CMS-HCC)  She's had neutropenia since 2013. Initially this was very mild at 4.7 but now this has dropped to 3.7 since 6 months ago. No other abnormalities in the other cell lines. I will do the referral to the hematologist for further evaluation. She does have a history of autoimmune disease specifically granuloma annulare and Graves' disease which later on converted to hypothyroidism.  Autoimmune diseases can cause low white cell count. Nevertheless we need to get her worked up to make sure she does not have hematologic problems or hematologic malignancy.  - REFERRAL TO HEMATOLOGY ONCOLOGY Referral to?: [facility]  - TSH; Future  - LIPID PROFILE; Future  - COMP METABOLIC PANEL; Future  - CBC WITH DIFFERENTIAL; Future  - VITAMIN D,25 HYDROXY; Future    2.  Dyslipidemia  Doing well on simvastatin.  - simvastatin (ZOCOR) 20 MG Tab; TAKE ONE TABLET BY MOUTH EVERY EVENING  Dispense: 30 Tab; Refill: 5  - TSH; Future  - LIPID PROFILE; Future  - COMP METABOLIC PANEL; Future  - CBC WITH DIFFERENTIAL; Future  - VITAMIN D,25 HYDROXY; Future    3. Hypothyroidism, unspecified type  Adequately replaced. Continue same dose of levothyroxine.  - TSH; Future  - LIPID PROFILE; Future  - COMP METABOLIC PANEL; Future  - CBC WITH DIFFERENTIAL; Future  - VITAMIN D,25 HYDROXY; Future    4. Vitamin D deficiency  Vitamin D is slightly low. Advised to increase the dose of the vitamin D supplementation 1000 international units Arpan the current dose. Recheck vitamin D level next visit.  - TSH; Future  - LIPID PROFILE; Future  - COMP METABOLIC PANEL; Future  - CBC WITH DIFFERENTIAL; Future  - VITAMIN D,25 HYDROXY; Future    5. Symptomatic menopausal or female climacteric states  We will keep her on sertraline 50 mg daily because she may go through stress again with hematologic workup with low white cell count. We will plan on taking her off the sertraline slowly once workup for hematologic problem comes back negative for significant problems.  - sertraline (ZOLOFT) 50 MG Tab; Take 1 Tab by mouth every day.  Dispense: 30 Tab; Refill: 5  - TSH; Future  - LIPID PROFILE; Future  - COMP METABOLIC PANEL; Future  - CBC WITH DIFFERENTIAL; Future  - VITAMIN D,25 HYDROXY; Future      Please note that this dictation was created using voice recognition software. I have worked with consultants from the vendor as well as technical experts from [facility]  [employer] to optimize the interface. I have made every reasonable attempt to correct obvious errors, but I expect that there are errors of grammar and possibly content I did not discover before finalizing the note.

## 2017-08-02 ENCOUNTER — OFFICE VISIT (OUTPATIENT)
Dept: HEMATOLOGY ONCOLOGY | Facility: MEDICAL CENTER | Age: 62
End: 2017-08-02
Payer: COMMERCIAL

## 2017-08-02 VITALS
HEART RATE: 72 BPM | WEIGHT: 222.11 LBS | DIASTOLIC BLOOD PRESSURE: 70 MMHG | SYSTOLIC BLOOD PRESSURE: 126 MMHG | BODY MASS INDEX: 34.86 KG/M2 | OXYGEN SATURATION: 96 % | TEMPERATURE: 99.3 F | RESPIRATION RATE: 16 BRPM | HEIGHT: 67 IN

## 2017-08-02 DIAGNOSIS — D70.9 NEUTROPENIA, UNSPECIFIED TYPE (HCC): Primary | ICD-10-CM

## 2017-08-02 PROCEDURE — 99203 OFFICE O/P NEW LOW 30 MIN: CPT | Performed by: INTERNAL MEDICINE

## 2017-08-02 ASSESSMENT — PAIN SCALES - GENERAL: PAINLEVEL: NO PAIN

## 2017-08-02 NOTE — MR AVS SNAPSHOT
"        Melissa Escamilla   2017 4:00 PM   Office Visit   MRN: [id number]    Department:  [facility]   Dept Phone:  [phone]    Description:  Female : 1955   Provider:  Chris Basurto M.D.           Reason for Visit     New Patient Ref: Dr. Pino, Dx: Neutropenia      Allergies as of 2017     No Known Allergies      Vital Signs     Blood Pressure Pulse Temperature Respirations Height Weight    126/70 mmHg 72 37.4 °C (99.3 °F) 16 1.702 m (5' 7.01\") 100.75 kg (222 lb 1.8 oz)    Body Mass Index Oxygen Saturation Smoking Status             34.78 kg/m2 96% Current Some Day Smoker         Basic Information     Date Of Birth Sex Race Ethnicity Preferred Language    1955 Female White Non- English      Your appointments     Aug 17, 2017  2:20 PM   MA [id number] with [facility] MG 3   [facility] ([address])    [address] [place] [address] [address]  [facility] [address]   [phone]           No deodorant, powder, perfume or lotion under the arm or breast area.            2017 10:20 AM   Hematology Est Patient with Chris Basurto M.D.   [facility] (--)    [address], [address]  [facility] [address] [phone]            2018 10:00 AM   Established Patient with Jennifer Pino M.D.   [facility] (--)    [address] [facility]  [address]  [facility] [address] [phone]           You will be receiving a confirmation call a few days before your appointment from our automated call confirmation system.              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   Unknown - Present    Dyslipidemia E78.5   Unknown - Present    Other specified hypothyroidism E03.8   2015 - Present    Dysuria R30.0   3/28/2016 - Present    Obesity (BMI 35.0-39.9 without comorbidity) ([facility]) E66.9   2017 - Present    Obesity " (BMI 30-39.9) E66.9   6/13/2017 - Present      Health Maintenance        Date Due Completion Dates    PAP SMEAR 8/5/2016 8/5/2013 (Done)    Override on 8/5/2013: Done    IMM ZOSTER VACCINE 7/5/2017 ---    MAMMOGRAM 7/29/2017 7/29/2016, 7/22/2015, 7/21/2014, 6/21/2013, 5/18/2012, 12/27/2006, 11/23/2005, 10/5/2004    IMM INFLUENZA (1) 9/1/2017 9/12/2016, 9/19/2015, 9/19/2015, 10/22/2013, 10/15/2010    COLONOSCOPY 10/10/2022 10/10/2012 (Done)    Override on 10/10/2012: Done    IMM DTaP/Tdap/Td Vaccine (2 - Td) 4/22/2024 4/22/2014            Current Immunizations     Influenza TIV (IM) 10/22/2013    Influenza Vaccine Quad Inj (Pf) 9/12/2016, 9/19/2015 12:00 PM, 10/15/2010    Influenza Vaccine Quad Inj (Preserved) 9/19/2015    Tdap Vaccine 4/22/2014      Below and/or attached are the medications your provider expects you to take. Review all of your home medications and newly ordered medications with your provider and/or pharmacist. Follow medication instructions as directed by your provider and/or pharmacist. Please keep your medication list with you and share with your provider. Update the information when medications are discontinued, doses are changed, or new medications (including over-the-counter products) are added; and carry medication information at all times in the event of emergency situations     Allergies:  No Known Allergies          Medications  Valid as of: August 02, 2017 -  4:25 PM    Generic Name Brand Name Tablet Size Instructions for use    Amoxicillin-Pot Clavulanate (Tab) AUGMENTIN 875-125 MG Take 1 Tab by mouth 2 times a day.        Cholecalciferol (Tab) Vitamin D-3 5000 UNITS Take  by mouth. 1 tab 2 x / week         Ciprofloxacin HCl (Tab) CIPRO 500 MG Take 1 Tab by mouth 2 times a day.        Levothyroxine Sodium (Tab) SYNTHROID 88 MCG Take 1 Tab by mouth every day.        Levothyroxine Sodium (Tab) SYNTHROID 100 MCG Take 1 Tab by mouth Every morning on an empty stomach.        MetroNIDAZOLE  (Tab) FLAGYL 500 MG Take 1 Tab by mouth 2 Times a Day.        Sertraline HCl (Tab) ZOLOFT 50 MG Take 1 Tab by mouth every day.        Simvastatin (Tab) ZOCOR 20 MG TAKE ONE TABLET BY MOUTH EVERY EVENING        .                 Medicines prescribed today were sent to:     [id number] - [place], [place] - [address]    [address] [place] [address]    Phone: [phone] Fax: [phone]    Open 24 Hours?: No      Medication refill instructions:       If your prescription bottle indicates you have medication refills left, it is not necessary to call your provider’s office. Please contact your pharmacy and they will refill your medication.    If your prescription bottle indicates you do not have any refills left, you may request refills at any time through one of the following ways: The online [employer] system (except Urgent Care), by calling your provider’s office, or by asking your pharmacy to contact your provider’s office with a refill request. Medication refills are processed only during regular business hours and may not be available until the next business day. Your provider may request additional information or to have a follow-up visit with you prior to refilling your medication.   *Please Note: Medication refills are assigned a new Rx number when refilled electronically. Your pharmacy may indicate that no refills were authorized even though a new prescription for the same medication is available at the pharmacy. Please request the medicine by name with the pharmacy before contacting your provider for a refill.           [employer] Access Code: Activation code not generated  Current [employer] Status: Active          Quit Tobacco Information     Do you want to quit using tobacco?    Quitting tobacco decreases risks of cancer, heart and lung disease, increases life expectancy, improves sense of taste and smell, and increases spending money, among other benefits.    If you are thinking about quitting,  we can help.  • [facility] Quit Tobacco Program: 137.431.1053  o Program occurs weekly for four weeks and includes pharmacist consultation on products to support quitting smoking or chewing tobacco. A provider referral is needed for pharmacist consultation.  • Tobacco Users Help Hotline: 0-551-QUIT-NOW (915-5456) or https://[place].quitlogix.org/  o Free, confidential telephone and online coaching for [place] residents. Sessions are designed on a schedule that is convenient for you. Eligible clients receive free nicotine replacement therapy.  • Nationally: www.smokefree.gov  o Information and professional assistance to support both immediate and long-term needs as you become, and remain, a non-smoker. Smokefree.gov allows you to choose the help that best fits your needs.

## 2017-08-03 NOTE — PROGRESS NOTES
Consult Note: Hematology    Date of consultation: 8/2/2017 6:28 PM    Referring provider: Jennifer Pino M.D.    Reason for consultation:   CC: Neutropenia    History of presenting illness:   Melissa Escamilla  is a 62 y.o. year old female whose history of Graves' disease and granuloma annulare. Who is referred to the hematology clinic for neutropenia.  The patient denies any chronic or recurrent infections. She denies any acute complaints. Specifically denies any new pain, shortness of breath, headaches, joint aches nausea, vomiting, weight changes, loss of appetite, night sweats or change in bowel habits.  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    Past Medical History:    Past Medical History   Diagnosis Date   • Hypothyroidism    • Symptomatic menopausal or female climacteric states    • Granuloma annulare    • Dyslipidemia        Past surgical history:    Past Surgical History   Procedure Laterality Date   • Primary c section     • Colonoscopy  age 47     normal   • Gastroscopy-endo  10/10/12     normal esophagus, stomach and duodenum   • Colonoscopy with polyp  10/10/12     colon polyp-adenomatous, hemorrhoids       Allergies:  Review of patient's allergies indicates no known allergies.    Medications:    Current Outpatient Prescriptions   Medication Sig Dispense Refill   • simvastatin (ZOCOR) 20 MG Tab TAKE ONE TABLET BY MOUTH EVERY EVENING 30 Tab 5   • sertraline (ZOLOFT) 50 MG Tab Take 1 Tab by mouth every day. 30 Tab 5   • levothyroxine (SYNTHROID) 100 MCG Tab Take 1 Tab by mouth Every morning on an empty stomach. 30 Tab 5   • Cholecalciferol (VITAMIN D-3) 5000 UNIT TABS Take  by mouth. 1 tab 2 x / week      • amoxicillin-clavulanate (AUGMENTIN) 875-125 MG Tab Take 1 Tab by mouth 2 times a day. 20 Tab 0   • ciprofloxacin (CIPRO) 500 MG Tab Take 1 Tab by mouth 2 times a day. 14 Tab 0  "  • metronidazole (FLAGYL) 500 MG Tab Take 1 Tab by mouth 2 Times a Day. 20 Tab 0   • levothyroxine (SYNTHROID) 88 MCG Tab Take 1 Tab by mouth every day. 30 Tab 5     No current facility-administered medications for this visit.       Social History:     Social History     Social History   • Marital Status:      Spouse Name: N/A   • Number of Children: N/A   • Years of Education: N/A     Occupational History   •  [employer] Employee     Social History Main Topics   • Smoking status: Current Some Day Smoker     Types: Cigarettes   • Smokeless tobacco: Never Used      Comment: 1 cig/day   • Alcohol Use: 0.0 oz/week     0 Standard drinks or equivalent per week      Comment: 1 glass wine/day   • Drug Use: No   • Sexual Activity:     Partners: Male     Birth Control/ Protection: Post-Menopausal     Other Topics Concern   • Not on file     Social History Narrative       Family History:     Family History   Problem Relation Age of Onset   • Arthritis Mother      osteoarthritis   • Thyroid Mother    • Hypertension Mother        Review of Systems:  Constitutional: No fever, chills, weight loss ,malaise/fatigue.      All other review of systems are negative except what was mentioned above in the HPI.    Physical Exam:  Vitals:   /70 mmHg  Pulse 72  Temp(Src) 37.4 °C (99.3 °F)  Resp 16  Ht 1.702 m (5' 7.01\")  Wt 100.75 kg (222 lb 1.8 oz)  BMI 34.78 kg/m2  SpO2 96%    General: Not in acute distress, alert and oriented x 3  HEENT: No pallor, icterus. Oropharynx clear.   Neck: Supple, no palpable masses.  Lymph nodes: No palpable cervical, supraclavicular, axillary or inguinal lymphadenopathy.    CVS: regular rate and rhythm, no rubs or gallops  RESP: Clear to auscultate bilaterally, no wheezing or crackles.   ABD: Soft, non tender, non distended, positive bowel sounds, no palpable organomegaly  EXT: No edema or cyanosis  CNS: Alert and oriented x3, No focal deficits.  Skin- No rash      Labs: "   Results for SAMMI FUNEZ (MRN [id number]) as of 8/2/2017 18:32   11/2/2013 09:41 11/2/2013 09:42 6/18/2014 09:25 12/4/2015 09:28 1/16/2017 09:28 7/26/2017 08:19   WBC 4.7 (L) 4.7 (L) 4.1 (L) 4.4 (L) 3.7 (L) 3.7 (L)   RBC 4.33 4.35 4.21 4.24 4.39 4.23   Hemoglobin 13.2 13.2 12.9 13.1 13.7 12.7   Hematocrit 40.0 40.1 38.5 41.2 40.8 39.7   MCV 92.3 92.2 91.6 97.2 92.9 93.9   MCH 30.5 30.3 30.6 30.9 31.2 30.0   MCHC 33.0 32.9 33.4 31.8 (L) 33.6 32.0 (L)   RDW 13.0 13.1 13.6 46.5 44.0 44.4   Platelet Count 247 253 231 232 236 243   MPV 9.9 10.0 9.7 11.7 11.3 11.6   Neutrophils-Polys 63.0 63.3 59.0 55.80 58.50 47.00   Neutrophils (Absolute) 2.9 3.0 2.4 2.44 2.18 1.73 (L)   Lymphocytes 26.3 25.8 30.4 31.70 30.00 40.20   Lymphs (Absolute) 1.2 1.2 1.3 1.39 1.12 1.48   Monocytes 8.4 8.2 7.7 9.10 8.80 9.50   Monos (Absolute)    0.40 0.33 0.35   Eosinophils 2.0 2.5 2.5 2.70 2.10 2.70   Eos (Absolute)    0.12 0.08 0.10   Basophils 0.4 0.3 0.4 0.50 0.30 0.30   Baso (Absolute)    0.02 0.01 0.01   Immature Granulocytes    0.20 0.30 0.30   Immature Granulocytes (abs)    0.01 0.01 0.01   Nucleated RBC    0.00 0.00 0.00   NRBC (Absolute)    0.00 0.00 0.00         Assessment and Plan:  -Leukopenia  -Secondary to mild neutropenia.  -Mild  -Chronic  -Not associated with any recurrent or chronic infections  -No indication for growth factor support or bone marrow biopsy  -Patient has never had absolute neutropenia [ANC less than 1000]  -Patient's neutropenia is most likely associated with her autoimmune condition.  -Continue to follow.  -Patient does not want to have blood drawn today. We will put in orders for patient to have a CBC with manual differential as well as B12 folate iron and copper levels.    Return to clinic in 4 months    I spent 30 minutes in face-to-face time with patient, of which >50 % was spent in counseling and coordination of care surrounding the above issues as discussed in the assessment and plan.    She agreed  and verbalized her agreement and understanding with the current plan.  I answered all questions and concerns she has at this time.   Dear  Jennifer Pino M.D.,  Thank you for allowing me to participate in her care.    Please note that this dictation was created using voice recognition software. I have made every reasonable attempt to correct obvious errors, but I expect that there are errors of grammar and possibly content that I did not discover before finalizing the note.      SIGNATURES:  Chris Basurto    CC:  KOLBY Templeton Bella B, M.D.

## 2017-08-17 ENCOUNTER — HOSPITAL ENCOUNTER (OUTPATIENT)
Dept: RADIOLOGY | Facility: MEDICAL CENTER | Age: 62
End: 2017-08-17
Attending: OBSTETRICS & GYNECOLOGY
Payer: COMMERCIAL

## 2017-08-17 DIAGNOSIS — Z12.31 ENCOUNTER FOR MAMMOGRAM TO ESTABLISH BASELINE MAMMOGRAM: ICD-10-CM

## 2017-08-17 PROCEDURE — 77063 BREAST TOMOSYNTHESIS BI: CPT

## 2017-10-26 ENCOUNTER — TELEPHONE (OUTPATIENT)
Dept: HEMATOLOGY ONCOLOGY | Facility: MEDICAL CENTER | Age: 62
End: 2017-10-26

## 2017-10-26 NOTE — TELEPHONE ENCOUNTER
Left a message on the patient's voicemail of a change in her follow up appt.  Was scheduled for 11/3/2017 @ 10:20am, new appt date and time is 11/3/2017 @ 10am.

## 2017-11-03 ENCOUNTER — HOSPITAL ENCOUNTER (OUTPATIENT)
Dept: LAB | Facility: MEDICAL CENTER | Age: 62
End: 2017-11-03
Attending: INTERNAL MEDICINE
Payer: COMMERCIAL

## 2017-11-03 DIAGNOSIS — D70.9 NEUTROPENIA, UNSPECIFIED TYPE (HCC): ICD-10-CM

## 2017-11-03 LAB
BASOPHILS # BLD AUTO: 0 % (ref 0–1.8)
BASOPHILS # BLD: 0 K/UL (ref 0–0.12)
EOSINOPHIL # BLD AUTO: 0.04 K/UL (ref 0–0.51)
EOSINOPHIL NFR BLD: 0.9 % (ref 0–6.9)
ERYTHROCYTE [DISTWIDTH] IN BLOOD BY AUTOMATED COUNT: 45 FL (ref 35.9–50)
FERRITIN SERPL-MCNC: 135.9 NG/ML (ref 10–291)
FOLATE SERPL-MCNC: 10 NG/ML
HCT VFR BLD AUTO: 41.9 % (ref 37–47)
HGB BLD-MCNC: 13.4 G/DL (ref 12–16)
IRON SATN MFR SERPL: 23 % (ref 15–55)
IRON SERPL-MCNC: 90 UG/DL (ref 40–170)
LYMPHOCYTES # BLD AUTO: 1.73 K/UL (ref 1–4.8)
LYMPHOCYTES NFR BLD: 36.8 % (ref 22–41)
MANUAL DIFF BLD: NORMAL
MCH RBC QN AUTO: 30.2 PG (ref 27–33)
MCHC RBC AUTO-ENTMCNC: 32 G/DL (ref 33.6–35)
MCV RBC AUTO: 94.4 FL (ref 81.4–97.8)
MONOCYTES # BLD AUTO: 0.33 K/UL (ref 0–0.85)
MONOCYTES NFR BLD AUTO: 7 % (ref 0–13.4)
NEUTROPHILS NFR BLD: 55.3 % (ref 44–72)
PLATELET # BLD AUTO: 256 K/UL (ref 164–446)
PMV BLD AUTO: 11.5 FL (ref 9–12.9)
RBC # BLD AUTO: 4.44 M/UL (ref 4.2–5.4)
TIBC SERPL-MCNC: 393 UG/DL (ref 250–450)
VIT B12 SERPL-MCNC: 272 PG/ML (ref 211–911)
WBC # BLD AUTO: 4.7 K/UL (ref 4.8–10.8)

## 2017-11-03 PROCEDURE — 85007 BL SMEAR W/DIFF WBC COUNT: CPT

## 2017-11-03 PROCEDURE — 85027 COMPLETE CBC AUTOMATED: CPT

## 2017-11-03 PROCEDURE — 82525 ASSAY OF COPPER: CPT

## 2017-11-03 PROCEDURE — 36415 COLL VENOUS BLD VENIPUNCTURE: CPT

## 2017-11-03 PROCEDURE — 83540 ASSAY OF IRON: CPT

## 2017-11-03 PROCEDURE — 82607 VITAMIN B-12: CPT

## 2017-11-03 PROCEDURE — 82728 ASSAY OF FERRITIN: CPT

## 2017-11-03 PROCEDURE — 83550 IRON BINDING TEST: CPT

## 2017-11-03 PROCEDURE — 82746 ASSAY OF FOLIC ACID SERUM: CPT

## 2017-11-06 ENCOUNTER — OFFICE VISIT (OUTPATIENT)
Dept: HEMATOLOGY ONCOLOGY | Facility: MEDICAL CENTER | Age: 62
End: 2017-11-06
Payer: COMMERCIAL

## 2017-11-06 VITALS
OXYGEN SATURATION: 96 % | TEMPERATURE: 97.5 F | WEIGHT: 228.07 LBS | HEART RATE: 66 BPM | RESPIRATION RATE: 18 BRPM | DIASTOLIC BLOOD PRESSURE: 76 MMHG | SYSTOLIC BLOOD PRESSURE: 130 MMHG | BODY MASS INDEX: 35.8 KG/M2 | HEIGHT: 67 IN

## 2017-11-06 DIAGNOSIS — D70.8 OTHER NEUTROPENIA (HCC): ICD-10-CM

## 2017-11-06 PROCEDURE — 99213 OFFICE O/P EST LOW 20 MIN: CPT | Performed by: INTERNAL MEDICINE

## 2017-11-06 ASSESSMENT — PAIN SCALES - GENERAL: PAINLEVEL: NO PAIN

## 2017-11-06 NOTE — PROGRESS NOTES
Consult Note: Hematology    Date of consultation: 11/6/2017    Referring provider: Jennifer Pino M.D.    Reason for consultation:   CC: Neutropenia    History of presenting illness:   Melissa Escamilla  is a 62 y.o. year old female whose history of Graves' disease and granuloma annulare. Who is referred to the hematology clinic for neutropenia.  The patient denies any chronic or recurrent infections. She denies any acute complaints. Specifically denies any new pain, shortness of breath, headaches, joint aches nausea, vomiting, weight changes, loss of appetite, night sweats or change in bowel habits.  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      Interval History:  Initially seen in our clinic on 8/2/2017   Melissa Escamilla is a 62 y.o. Female who was seen in clinic for chronic neutropenia.        Leukopenia  Location, blood  Severity, mild  Timing, constant  Modifying factors, no   Quality, Neutropenia  Duration, chronic  Context, discovered on routine blood work  Associated factors, not associated with any chronic or recurring infections    Past Medical History:    Past Medical History:   Diagnosis Date   • Dyslipidemia    • Granuloma annulare    • Hypothyroidism    • Symptomatic menopausal or female climacteric states        Past surgical history:    Past Surgical History:   Procedure Laterality Date   • GASTROSCOPY-ENDO  10/10/12    normal esophagus, stomach and duodenum   • COLONOSCOPY WITH POLYP  10/10/12    colon polyp-adenomatous, hemorrhoids   • COLONOSCOPY  age 47    normal   • PRIMARY C SECTION         Allergies:  Review of patient's allergies indicates no known allergies.    Medications:    Current Outpatient Prescriptions   Medication Sig Dispense Refill   • simvastatin (ZOCOR) 20 MG Tab TAKE ONE TABLET BY MOUTH EVERY EVENING 30 Tab 5   • sertraline (ZOLOFT) 50 MG Tab Take 1 Tab by  "mouth every day. 30 Tab 5   • levothyroxine (SYNTHROID) 100 MCG Tab Take 1 Tab by mouth Every morning on an empty stomach. 30 Tab 5   • Cholecalciferol (VITAMIN D-3) 5000 UNIT TABS Take  by mouth. 1 tab 2 x / week      • amoxicillin-clavulanate (AUGMENTIN) 875-125 MG Tab Take 1 Tab by mouth 2 times a day. 20 Tab 0   • ciprofloxacin (CIPRO) 500 MG Tab Take 1 Tab by mouth 2 times a day. 14 Tab 0   • metronidazole (FLAGYL) 500 MG Tab Take 1 Tab by mouth 2 Times a Day. 20 Tab 0   • levothyroxine (SYNTHROID) 88 MCG Tab Take 1 Tab by mouth every day. 30 Tab 5     No current facility-administered medications for this visit.        Social History:     Social History     Social History   • Marital status:      Spouse name: N/A   • Number of children: N/A   • Years of education: N/A     Occupational History   •  [employer] Employee     Social History Main Topics   • Smoking status: Current Some Day Smoker     Types: Cigarettes   • Smokeless tobacco: Never Used      Comment: 1 cig/day   • Alcohol use 0.0 oz/week      Comment: 1 glass wine/day   • Drug use: No   • Sexual activity: Yes     Partners: Male     Birth control/ protection: Post-Menopausal     Other Topics Concern   • Not on file     Social History Narrative   • No narrative on file       Family History:     Family History   Problem Relation Age of Onset   • Arthritis Mother      osteoarthritis   • Thyroid Mother    • Hypertension Mother        Review of Systems:  Constitutional: No fever, chills, weight loss ,malaise/fatigue.      All other review of systems are negative except what was mentioned above in the HPI.    Physical Exam:  Vitals:   /76   Pulse 66   Temp 36.4 °C (97.5 °F)   Resp 18   Ht 1.702 m (5' 7.01\")   Wt 103.4 kg (228 lb 1.1 oz)   SpO2 96%   Breastfeeding? No   BMI 35.71 kg/m²     General: Not in acute distress, alert and oriented x 3  HEENT: No pallor, icterus. Oropharynx clear.   Neck: Supple, no palpable " masses.  Lymph nodes: No palpable cervical, supraclavicular, axillary or inguinal lymphadenopathy.    CVS: regular rate and rhythm, no rubs or gallops  RESP: Clear to auscultate bilaterally, no wheezing or crackles.   ABD: Soft, non tender, non distended, positive bowel sounds, no palpable organomegaly  EXT: No edema or cyanosis  CNS: Alert and oriented x3, No focal deficits.  Skin- No rash      Labs:   Results for SAMMI FUNEZ (MRN [id number]) as of 11/6/2017 10:12   Ref. Range 11/3/2017 10:30   WBC Latest Ref Range: 4.8 - 10.8 K/uL 4.7 (L)   RBC Latest Ref Range: 4.20 - 5.40 M/uL 4.44   Hemoglobin Latest Ref Range: 12.0 - 16.0 g/dL 13.4   Hematocrit Latest Ref Range: 37.0 - 47.0 % 41.9   MCV Latest Ref Range: 81.4 - 97.8 fL 94.4   MCH Latest Ref Range: 27.0 - 33.0 pg 30.2   MCHC Latest Ref Range: 33.6 - 35.0 g/dL 32.0 (L)   RDW Latest Ref Range: 35.9 - 50.0 fL 45.0   Platelet Count Latest Ref Range: 164 - 446 K/uL 256   MPV Latest Ref Range: 9.0 - 12.9 fL 11.5   Neutrophils-Polys Latest Ref Range: 44.00 - 72.00 % 55.30   Lymphocytes Latest Ref Range: 22.00 - 41.00 % 36.80   Lymphs (Absolute) Latest Ref Range: 1.00 - 4.80 K/uL 1.73   Monocytes Latest Ref Range: 0.00 - 13.40 % 7.00   Monos (Absolute) Latest Ref Range: 0.00 - 0.85 K/uL 0.33   Eosinophils Latest Ref Range: 0.00 - 6.90 % 0.90   Eos (Absolute) Latest Ref Range: 0.00 - 0.51 K/uL 0.04   Basophils Latest Ref Range: 0.00 - 1.80 % 0.00   Baso (Absolute) Latest Ref Range: 0.00 - 0.12 K/uL 0.00   Manual Diff Status Unknown PERFORMED   Iron Latest Ref Range: 40 - 170 ug/dL 90   Total Iron Binding Latest Ref Range: 250 - 450 ug/dL 393   % Saturation Latest Ref Range: 15 - 55 % 23   Vitamin B12 -True Cobalamin Latest Ref Range: 211 - 911 pg/mL 272   Ferritin Latest Ref Range: 10.0 - 291.0 ng/mL 135.9   Folate -Folic Acid Latest Ref Range: >4.0 ng/mL 10.0         Assessment and Plan:  -Leukopenia  -Secondary to mild neutropenia.  -ANC on November 3 is  3000  -Mild  -Chronic  -Not associated with any recurrent or chronic infections  -No indication for growth factor support or bone marrow biopsy  -Patient has never had absolute neutropenia [ANC less than 1000]  -Patient's neutropenia is most likely associated with her autoimmune condition.  -Continue to follow per primary care physician  -Vitamin B12 level is noted as 272, recommend taking a daily multivitamin  -Follow-up as needed      She agreed and verbalized her agreement and understanding with the current plan.  I answered all questions and concerns she has at this time.   Dear  Jennifer Pino M.D.,  Thank you for allowing me to participate in her care.    Please note that this dictation was created using voice recognition software. I have made every reasonable attempt to correct obvious errors, but I expect that there are errors of grammar and possibly content that I did not discover before finalizing the note.      SIGNATURES:  Chris Basurto    CC:  KOLBY Templeton Bella B, M.D.

## 2017-11-07 LAB — COPPER SERPL-MCNC: 126 UG/DL (ref 80–155)

## 2017-12-12 ENCOUNTER — OFFICE VISIT (OUTPATIENT)
Dept: MEDICAL GROUP | Facility: PHYSICIAN GROUP | Age: 62
End: 2017-12-12
Payer: COMMERCIAL

## 2017-12-12 VITALS
DIASTOLIC BLOOD PRESSURE: 80 MMHG | TEMPERATURE: 98 F | SYSTOLIC BLOOD PRESSURE: 124 MMHG | WEIGHT: 226.63 LBS | OXYGEN SATURATION: 96 % | BODY MASS INDEX: 35.57 KG/M2 | HEIGHT: 67 IN | HEART RATE: 70 BPM

## 2017-12-12 DIAGNOSIS — M75.42 IMPINGEMENT SYNDROME OF SHOULDER REGION, LEFT: ICD-10-CM

## 2017-12-12 DIAGNOSIS — Z23 NEED FOR IMMUNIZATION AGAINST INFLUENZA: ICD-10-CM

## 2017-12-12 PROCEDURE — 90471 IMMUNIZATION ADMIN: CPT | Performed by: FAMILY MEDICINE

## 2017-12-12 PROCEDURE — 99213 OFFICE O/P EST LOW 20 MIN: CPT | Mod: 25 | Performed by: FAMILY MEDICINE

## 2017-12-12 PROCEDURE — 90686 IIV4 VACC NO PRSV 0.5 ML IM: CPT | Performed by: FAMILY MEDICINE

## 2017-12-13 NOTE — PROGRESS NOTES
"Subjective:      Melissa Escamilla is a 62 y.o. female who presents with Pain (left arm)            HPI     Patient is here complaining of left shoulder pain noted with certain movement. Pain goes down to the left arm. She said it started about 8 months to one year ago when she was sitting in a recliner and started to  her laptop from the floor. She said she hyperextended her left arm at that time. She wants to see the orthopedist she needs to get a referral from her primary care physician. She did not have any fall or any direct trauma to the shoulder.    She also needs flu shot today.    Past medical history, past surgical history, family history reviewed-no changes    Social history reviewed-no changes    Allergies reviewed-no changes    Medications reviewed-no changes    ROS     Review of systems as per history of present illness, the rest are negative.     Objective:     /80   Pulse 70   Temp 36.7 °C (98 °F)   Ht 1.702 m (5' 7\")   Wt 102.8 kg (226 lb 10.1 oz)   SpO2 96%   BMI 35.50 kg/m²      Physical Exam     Examined alert, awake, oriented, not in distress    Neck-supple, no lymphadenopathy, no thyromegaly  Lungs-clear to auscultation, no rales, no wheezes  Heart-regular rate and rhythm, no murmur  Extremities-no edema, clubbing, cyanosis, left shoulder exam-no pinpoint tenderness before meals joint, no deformities, she has full range of motion movement and for elevation but slow because of the pain and stiffness, abduction is only up to 170°, she has good external rotation but she has very limited internal rotation.          Assessment/Plan:     1. Impingement syndrome of shoulder region, left  She has impingement sign of the left shoulder. This is concerning for rotator cuff injury. Referral placed orthopedist. She does not take any over-the-counter medication for pain and she said she does not want to take anything at this time. Advised warm compresses 15 minutes 3 times a day.  - " REFERRAL TO ORTHOPEDICS    2. Need for immunization against influenza  Flu shot was given.  - Flu Quad Inj >3 Year Pre-Filled PF    3. BMI 35.0-35.9,adult  Advised diet, exercise and weight loss.  - Patient identified as having weight management issue.  Appropriate orders and counseling given.      Please note that this dictation was created using voice recognition software. I have worked with consultants from the vendor as well as technical experts from [facility]  [employer] to optimize the interface. I have made every reasonable attempt to correct obvious errors, but I expect that there are errors of grammar and possibly content I did not discover before finalizing the note.

## 2017-12-18 ENCOUNTER — OFFICE VISIT (OUTPATIENT)
Dept: MEDICAL GROUP | Facility: PHYSICIAN GROUP | Age: 62
End: 2017-12-18
Payer: COMMERCIAL

## 2017-12-18 VITALS
OXYGEN SATURATION: 94 % | HEART RATE: 81 BPM | TEMPERATURE: 98.5 F | DIASTOLIC BLOOD PRESSURE: 70 MMHG | SYSTOLIC BLOOD PRESSURE: 120 MMHG | BODY MASS INDEX: 35.57 KG/M2 | WEIGHT: 226.63 LBS | HEIGHT: 67 IN

## 2017-12-18 DIAGNOSIS — J01.90 ACUTE SINUSITIS, RECURRENCE NOT SPECIFIED, UNSPECIFIED LOCATION: ICD-10-CM

## 2017-12-18 PROCEDURE — 99213 OFFICE O/P EST LOW 20 MIN: CPT | Performed by: FAMILY MEDICINE

## 2017-12-18 RX ORDER — AMOXICILLIN AND CLAVULANATE POTASSIUM 875; 125 MG/1; MG/1
1 TABLET, FILM COATED ORAL 2 TIMES DAILY
Qty: 20 TAB | Refills: 0 | Status: SHIPPED | OUTPATIENT
Start: 2017-12-18 | End: 2018-09-19

## 2017-12-18 ASSESSMENT — PATIENT HEALTH QUESTIONNAIRE - PHQ9: CLINICAL INTERPRETATION OF PHQ2 SCORE: 0

## 2017-12-18 NOTE — PROGRESS NOTES
"Subjective:      Melissa Escamilla is a 62 y.o. female who presents with Cough (congestion') and Fever            HPI     Patient is here complaining of nasal congestion with clear nasal discharge, facial pressure, dry cough, feeling feverish with sweating since 3 days ago. She has been taking over-the-counter NSAIDs as needed. She has a little bit of pain in the right ear and a little bit of sore throat as well. She said in the evenings that's when her symptoms get worse and she can hardly breathe because of the congestion. She denies any muscle aches or pains but she just doesn't feel well.    Past medical history, past surgical history, family history reviewed-no changes    Social history reviewed-no changes    Allergies reviewed-no changes    Medications reviewed-no changes    ROS     Review of systems as per history of present illness, the rest are negative.       Objective:     /70   Pulse 81   Temp 36.9 °C (98.5 °F)   Ht 1.702 m (5' 7\")   Wt 102.8 kg (226 lb 10.1 oz)   SpO2 94%   BMI 35.50 kg/m²      Physical Exam     Examined alert, awake, oriented, not in distress    Ears-tympanic membranes intact without evidence of infection  Nose-enlarged turbinates right nostril with near total obstruction, no discharge, nontender frontal and maxillary sinuses  Throat-mild erythema, tonsils not enlarged, no exudates  Neck-supple, no lymphadenopathy, no thyromegaly  Lungs-clear to auscultation, no rales, no wheezes  Heart-regular rate and rhythm, no murmur  Extremities-no edema, clubbing, cyanosis            Assessment/Plan:     1. Acute sinusitis, recurrence not specified, unspecified location  I think she has early sinusitis. We will treat with Augmentin 875 mg one tablet twice daily for 10 days. May take over-the-counter Mucinex D. Advised increase by mouth fluids and rest. Return if there's no improvement or if there is worsening of symptoms.      Please note that this dictation was created using voice " recognition software. I have worked with consultants from the vendor as well as technical experts from [facility] to optimize the interface. I have made every reasonable attempt to correct obvious errors, but I expect that there are errors of grammar and possibly content I did not discover before finalizing the note.

## 2018-04-27 ENCOUNTER — OFFICE VISIT (OUTPATIENT)
Dept: URGENT CARE | Facility: PHYSICIAN GROUP | Age: 63
End: 2018-04-27
Payer: COMMERCIAL

## 2018-04-27 VITALS
BODY MASS INDEX: 33.34 KG/M2 | OXYGEN SATURATION: 95 % | TEMPERATURE: 99.2 F | WEIGHT: 220 LBS | HEART RATE: 84 BPM | HEIGHT: 68 IN | SYSTOLIC BLOOD PRESSURE: 112 MMHG | DIASTOLIC BLOOD PRESSURE: 74 MMHG | RESPIRATION RATE: 16 BRPM

## 2018-04-27 DIAGNOSIS — J01.40 ACUTE PANSINUSITIS, RECURRENCE NOT SPECIFIED: ICD-10-CM

## 2018-04-27 PROCEDURE — 99214 OFFICE O/P EST MOD 30 MIN: CPT | Performed by: NURSE PRACTITIONER

## 2018-04-27 RX ORDER — KETOROLAC TROMETHAMINE 30 MG/ML
60 INJECTION, SOLUTION INTRAMUSCULAR; INTRAVENOUS ONCE
Status: COMPLETED | OUTPATIENT
Start: 2018-04-27 | End: 2018-04-27

## 2018-04-27 RX ORDER — FLUTICASONE PROPIONATE 50 MCG
2 SPRAY, SUSPENSION (ML) NASAL DAILY
Qty: 1 BOTTLE | Refills: 0 | Status: SHIPPED | OUTPATIENT
Start: 2018-04-27 | End: 2019-08-12

## 2018-04-27 RX ORDER — AMOXICILLIN AND CLAVULANATE POTASSIUM 875; 125 MG/1; MG/1
1 TABLET, FILM COATED ORAL 2 TIMES DAILY
Qty: 14 TAB | Refills: 0 | Status: SHIPPED | OUTPATIENT
Start: 2018-04-27 | End: 2018-05-04

## 2018-04-27 RX ADMIN — KETOROLAC TROMETHAMINE 60 MG: 30 INJECTION, SOLUTION INTRAMUSCULAR; INTRAVENOUS at 10:40

## 2018-04-27 ASSESSMENT — ENCOUNTER SYMPTOMS
MYALGIAS: 0
DIZZINESS: 0
CHILLS: 0
HEADACHES: 1
VOMITING: 0
EYE DISCHARGE: 0
TINGLING: 0
NECK PAIN: 0
ABDOMINAL PAIN: 0
WEAKNESS: 0
FEVER: 0
SINUS PAIN: 1
EYE PAIN: 0
BLURRED VISION: 0
EYE REDNESS: 0
NAUSEA: 0
SENSORY CHANGE: 0
DOUBLE VISION: 0
PHOTOPHOBIA: 0
SORE THROAT: 0

## 2018-04-27 NOTE — PROGRESS NOTES
Subjective:      Melissa Escamilla is a 62 y.o. female who presents with Headache (x1 week, with congestion, sinus issues)            HPI  Melissa is here for left side sinus pressure with severe HA around eyes/nose. Using Advil, Zyrtec-D, generic migraine edmond which does not help with HA. Denies vision change, dizziness, n/v. Denies fever, sore throat, cough.    PMH:  has a past medical history of Dyslipidemia; Granuloma annulare; Hypothyroidism; and Symptomatic menopausal or female climacteric states. She also has no past medical history of Breast cancer (CMS-[facility]).  MEDS:   Current Outpatient Prescriptions:   •  fluticasone (FLONASE) 50 MCG/ACT nasal spray, Spray 2 Sprays in nose every day., Disp: 1 Bottle, Rfl: 0  •  amoxicillin-clavulanate (AUGMENTIN) 875-125 MG Tab, Take 1 Tab by mouth 2 times a day for 7 days., Disp: 14 Tab, Rfl: 0  •  levothyroxine (SYNTHROID) 100 MCG Tab, TAKE ONE TABLET BY MOUTH EVERY MORNING ON EMPTY STOMACH, Disp: 30 Tab, Rfl: 5  •  simvastatin (ZOCOR) 20 MG Tab, TAKE ONE TABLET BY MOUTH EVERY EVENING, Disp: 30 Tab, Rfl: 5  •  sertraline (ZOLOFT) 50 MG Tab, Take 1 Tab by mouth every day., Disp: 30 Tab, Rfl: 5  •  amoxicillin-clavulanate (AUGMENTIN) 875-125 MG Tab, Take 1 Tab by mouth 2 times a day., Disp: 20 Tab, Rfl: 0  •  Cholecalciferol (VITAMIN D-3) 5000 UNIT TABS, Take  by mouth. 1 tab 2 x / week , Disp: , Rfl:     Current Facility-Administered Medications:   •  ketorolac (TORADOL) injection 60 mg, 60 mg, Intramuscular, Once, Etelvina Dumas, F.N.P.  ALLERGIES: No Known Allergies  SURGHX:   Past Surgical History:   Procedure Laterality Date   • COLONOSCOPY  02/14/2018    small int hemorrhoids   • GASTROSCOPY-ENDO  10/10/12    normal esophagus, stomach and duodenum   • COLONOSCOPY WITH POLYP  10/10/12    colon polyp-adenomatous, hemorrhoids   • COLONOSCOPY  age 47    normal   • PRIMARY C SECTION       SOCHX:  reports that she has been smoking Cigarettes.  She has never used smokeless  "tobacco. She reports that she drinks alcohol. She reports that she does not use drugs.  FH: Family history was reviewed, no pertinent findings to report    Review of Systems   Constitutional: Negative for chills, fever and malaise/fatigue.   HENT: Positive for congestion and sinus pain. Negative for ear pain and sore throat.    Eyes: Negative for blurred vision, double vision, photophobia, pain, discharge and redness.   Gastrointestinal: Negative for abdominal pain, nausea and vomiting.   Musculoskeletal: Negative for myalgias and neck pain.   Skin: Negative for itching and rash.   Neurological: Positive for headaches. Negative for dizziness, tingling, sensory change and weakness.   Endo/Heme/Allergies: Negative for environmental allergies.   All other systems reviewed and are negative.         Objective:     /74   Pulse 84   Temp 37.3 °C (99.2 °F)   Resp 16   Ht 1.727 m (5' 8\")   Wt 99.8 kg (220 lb)   SpO2 95%   BMI 33.45 kg/m²      Physical Exam   Constitutional: She is oriented to person, place, and time. Vital signs are normal. She appears well-developed and well-nourished. She is active and cooperative.  Non-toxic appearance. She does not have a sickly appearance. She does not appear ill. No distress.   HENT:   Head: Normocephalic.   Right Ear: External ear and ear canal normal. Tympanic membrane is injected.   Left Ear: External ear and ear canal normal. Tympanic membrane is injected and bulging.   Nose: Mucosal edema and sinus tenderness present. No rhinorrhea. Left sinus exhibits maxillary sinus tenderness and frontal sinus tenderness.   Mouth/Throat: Uvula is midline. Mucous membranes are dry. No uvula swelling. No posterior oropharyngeal edema or posterior oropharyngeal erythema.   Eyes: Conjunctivae and EOM are normal. Pupils are equal, round, and reactive to light.   Neck: Normal range of motion. Neck supple.   Cardiovascular: Normal rate and regular rhythm.    Pulmonary/Chest: Effort normal " and breath sounds normal. No accessory muscle usage. No respiratory distress.   Musculoskeletal: Normal range of motion.   Lymphadenopathy:     She has no cervical adenopathy.   Neurological: She is alert and oriented to person, place, and time. She has normal strength. No cranial nerve deficit or sensory deficit. Coordination and gait normal. GCS eye subscore is 4. GCS verbal subscore is 5. GCS motor subscore is 6.   Skin: Skin is warm and dry. She is not diaphoretic.   Psychiatric: She has a normal mood and affect. Her speech is normal and behavior is normal. Judgment and thought content normal. She is not actively hallucinating. Cognition and memory are normal. She is attentive.   Vitals reviewed.              Assessment/Plan:     1. Acute pansinusitis, recurrence not specified    - fluticasone (FLONASE) 50 MCG/ACT nasal spray; Spray 2 Sprays in nose every day.  Dispense: 1 Bottle; Refill: 0  - amoxicillin-clavulanate (AUGMENTIN) 875-125 MG Tab; Take 1 Tab by mouth 2 times a day for 7 days.  Dispense: 14 Tab; Refill: 0  - ketorolac (TORADOL) injection 60 mg; 2 mL by Intramuscular route Once.    D/C Zyrtec-D, Migraine med  Increase water intake  Get rest  May use Ibuprofen/Tylenol prn for any fever, body aches or throat pain  May take longer acting antihistamine for seasonal allergy symptoms prn (without decongestant)  May use saline nasal spray for nasal congestion  May use Flonase 2x/day x 3 days, then 1x/day x 7 days for sinus pressure/ nasal congestion  May gargle with salt water prn for throat discomfort  May drink smoothies for nutrition if too painful to swallow solid foods  Monitor for productive cough, SOB and chest pain/tightness- need re-evaluation

## 2018-07-05 DIAGNOSIS — E78.5 DYSLIPIDEMIA: ICD-10-CM

## 2018-07-05 RX ORDER — SIMVASTATIN 20 MG
TABLET ORAL
Qty: 30 TAB | Refills: 4 | Status: SHIPPED | OUTPATIENT
Start: 2018-07-05 | End: 2018-09-19 | Stop reason: SDUPTHER

## 2018-08-25 ENCOUNTER — APPOINTMENT (OUTPATIENT)
Dept: RADIOLOGY | Facility: MEDICAL CENTER | Age: 63
End: 2018-08-25
Attending: FAMILY MEDICINE
Payer: COMMERCIAL

## 2018-08-30 ENCOUNTER — HOSPITAL ENCOUNTER (OUTPATIENT)
Dept: RADIOLOGY | Facility: MEDICAL CENTER | Age: 63
End: 2018-08-30
Attending: FAMILY MEDICINE
Payer: COMMERCIAL

## 2018-08-30 DIAGNOSIS — Z12.31 BREAST CANCER SCREENING BY MAMMOGRAM: ICD-10-CM

## 2018-08-30 PROCEDURE — 77067 SCR MAMMO BI INCL CAD: CPT

## 2018-09-19 ENCOUNTER — OFFICE VISIT (OUTPATIENT)
Dept: MEDICAL GROUP | Facility: PHYSICIAN GROUP | Age: 63
End: 2018-09-19
Payer: COMMERCIAL

## 2018-09-19 VITALS
TEMPERATURE: 98.2 F | BODY MASS INDEX: 34.05 KG/M2 | HEART RATE: 60 BPM | WEIGHT: 224.65 LBS | HEIGHT: 68 IN | OXYGEN SATURATION: 94 % | SYSTOLIC BLOOD PRESSURE: 124 MMHG | DIASTOLIC BLOOD PRESSURE: 70 MMHG

## 2018-09-19 DIAGNOSIS — E55.9 VITAMIN D DEFICIENCY: ICD-10-CM

## 2018-09-19 DIAGNOSIS — E03.9 HYPOTHYROIDISM, UNSPECIFIED TYPE: ICD-10-CM

## 2018-09-19 DIAGNOSIS — Z23 NEED FOR IMMUNIZATION AGAINST INFLUENZA: ICD-10-CM

## 2018-09-19 DIAGNOSIS — E78.5 DYSLIPIDEMIA: ICD-10-CM

## 2018-09-19 DIAGNOSIS — D70.9 NEUTROPENIA, UNSPECIFIED TYPE (HCC): ICD-10-CM

## 2018-09-19 DIAGNOSIS — N95.1 SYMPTOMATIC MENOPAUSAL OR FEMALE CLIMACTERIC STATES: ICD-10-CM

## 2018-09-19 PROCEDURE — 90686 IIV4 VACC NO PRSV 0.5 ML IM: CPT | Performed by: FAMILY MEDICINE

## 2018-09-19 PROCEDURE — 90471 IMMUNIZATION ADMIN: CPT | Performed by: FAMILY MEDICINE

## 2018-09-19 PROCEDURE — 99214 OFFICE O/P EST MOD 30 MIN: CPT | Mod: 25 | Performed by: FAMILY MEDICINE

## 2018-09-19 RX ORDER — SIMVASTATIN 20 MG
TABLET ORAL
Qty: 30 TAB | Refills: 5 | Status: SHIPPED | OUTPATIENT
Start: 2018-09-19 | End: 2019-05-20 | Stop reason: SDUPTHER

## 2018-09-19 ASSESSMENT — PATIENT HEALTH QUESTIONNAIRE - PHQ9: CLINICAL INTERPRETATION OF PHQ2 SCORE: 0

## 2018-09-20 NOTE — PROGRESS NOTES
"Subjective:      Melissa Escamilla is a 63 y.o. female who presents with Medication Refill            HPI     Patient returns for follow-up.  We have not seen her in over a year that is why we had her return for follow-up.    In terms of her dyslipidemia, she continues to take simvastatin without myalgias.    For her hypothyroidism, she continues to take thyroid replacement.  She said she ran out of the medication 3 days ago because she did not have any more refills.  Her last TSH was over a year ago and was adequately replaced.    For her vitamin D deficiency, she continues to take vitamin D supplementation regularly.    She was seen and evaluated by the hematologist for the  Neutropenia.  She was sent for additional blood work and no cause or etiology was found.  Was probably from Graves' disease and granuloma annulare.    For her symptomatic menopause, she continues to take sertraline with good results.  She wants to continue taking the medication.    Past medical history, past surgical history, family history reviewed-no changes    Social history reviewed-no changes    Allergies reviewed-no changes    Medications reviewed-no changes    ROS     As per HPI, the rest are negative.       Objective:     /70 (BP Location: Left arm, Patient Position: Sitting, BP Cuff Size: Adult)   Pulse 60   Temp 36.8 °C (98.2 °F) (Temporal)   Ht 1.727 m (5' 8\")   Wt 101.9 kg (224 lb 10.4 oz)   SpO2 94%   BMI 34.16 kg/m²      Physical Exam     Examined alert, awake, oriented, not in distress    Neck-supple, no lymphadenopathy, no thyromegaly  Lungs-clear to auscultation, no rales, no wheezes  Heart-regular rate and rhythm, no murmur  Extremities-no edema, clubbing, cyanosis                Assessment/Plan:     1. Dyslipidemia  We will do follow-up lipid panel as soon as possible.  - LIPID PROFILE; Future  - COMP METABOLIC PANEL; Future  - CBC WITH DIFFERENTIAL; Future  - TSH; Future  - VITAMIN D,25 HYDROXY; Future  - " simvastatin (ZOCOR) 20 MG Tab; TAKE ONE TABLET BY MOUTH EVERY EVENING  Dispense: 30 Tab; Refill: 5    2. Hypothyroidism, unspecified type  We will do follow-up TSH as soon as possible and adjust the dose depending on results.  - LIPID PROFILE; Future  - COMP METABOLIC PANEL; Future  - CBC WITH DIFFERENTIAL; Future  - TSH; Future  - VITAMIN D,25 HYDROXY; Future    3. Vitamin D deficiency  We will check vitamin D level and treat depending on the results.  - LIPID PROFILE; Future  - COMP METABOLIC PANEL; Future  - CBC WITH DIFFERENTIAL; Future  - TSH; Future  - VITAMIN D,25 HYDROXY; Future    4. Neutropenia, unspecified type (HCC)  This has been stable without B symptoms.  Hematologist thinks this is due to her autoimmune disease so we will continue to follow and refer back to hematologist if there is significant change in her number.  - LIPID PROFILE; Future  - COMP METABOLIC PANEL; Future  - CBC WITH DIFFERENTIAL; Future  - TSH; Future  - VITAMIN D,25 HYDROXY; Future    5. Symptomatic menopausal or female climacteric states  Stable on sertraline.  - LIPID PROFILE; Future  - COMP METABOLIC PANEL; Future  - CBC WITH DIFFERENTIAL; Future  - TSH; Future  - VITAMIN D,25 HYDROXY; Future  - sertraline (ZOLOFT) 50 MG Tab; Take 1 Tab by mouth every day.  Dispense: 30 Tab; Refill: 5    6. Need for immunization against influenza  Flu shot was given.  - Flu Quad Inj >3 Year Pre-Filled PF    7. BMI 34.0-34.9,adult  We discussed diet, exercise and weight loss.  - Patient identified as having weight management issue.  Appropriate orders and counseling given.    I discussed with patient the need to come back every 6 months for follow-up of her medical problems.      Please note that this dictation was created using voice recognition software. I have worked with consultants from the vendor as well as technical experts from [employer] to optimize the interface. I have made every reasonable attempt to correct obvious errors, but I  expect that there are errors of grammar and possibly content I did not discover before finalizing the note.

## 2018-10-11 ENCOUNTER — HOSPITAL ENCOUNTER (OUTPATIENT)
Dept: LAB | Facility: MEDICAL CENTER | Age: 63
End: 2018-10-11
Attending: FAMILY MEDICINE
Payer: COMMERCIAL

## 2018-10-11 DIAGNOSIS — E03.9 HYPOTHYROIDISM, UNSPECIFIED TYPE: ICD-10-CM

## 2018-10-11 DIAGNOSIS — D70.9 NEUTROPENIA, UNSPECIFIED TYPE (HCC): ICD-10-CM

## 2018-10-11 DIAGNOSIS — N95.1 SYMPTOMATIC MENOPAUSAL OR FEMALE CLIMACTERIC STATES: ICD-10-CM

## 2018-10-11 DIAGNOSIS — E55.9 VITAMIN D DEFICIENCY: ICD-10-CM

## 2018-10-11 DIAGNOSIS — E78.5 DYSLIPIDEMIA: ICD-10-CM

## 2018-10-11 LAB
25(OH)D3 SERPL-MCNC: 32 NG/ML (ref 30–100)
ALBUMIN SERPL BCP-MCNC: 4.5 G/DL (ref 3.2–4.9)
ALBUMIN/GLOB SERPL: 1.6 G/DL
ALP SERPL-CCNC: 63 U/L (ref 30–99)
ALT SERPL-CCNC: 18 U/L (ref 2–50)
ANION GAP SERPL CALC-SCNC: 6 MMOL/L (ref 0–11.9)
AST SERPL-CCNC: 15 U/L (ref 12–45)
BASOPHILS # BLD AUTO: 0.5 % (ref 0–1.8)
BASOPHILS # BLD: 0.02 K/UL (ref 0–0.12)
BILIRUB SERPL-MCNC: 0.5 MG/DL (ref 0.1–1.5)
BUN SERPL-MCNC: 22 MG/DL (ref 8–22)
CALCIUM SERPL-MCNC: 9.3 MG/DL (ref 8.5–10.5)
CHLORIDE SERPL-SCNC: 107 MMOL/L (ref 96–112)
CHOLEST SERPL-MCNC: 180 MG/DL (ref 100–199)
CO2 SERPL-SCNC: 26 MMOL/L (ref 20–33)
CREAT SERPL-MCNC: 0.81 MG/DL (ref 0.5–1.4)
EOSINOPHIL # BLD AUTO: 0.11 K/UL (ref 0–0.51)
EOSINOPHIL NFR BLD: 2.8 % (ref 0–6.9)
ERYTHROCYTE [DISTWIDTH] IN BLOOD BY AUTOMATED COUNT: 46.8 FL (ref 35.9–50)
FASTING STATUS PATIENT QL REPORTED: NORMAL
GLOBULIN SER CALC-MCNC: 2.8 G/DL (ref 1.9–3.5)
GLUCOSE SERPL-MCNC: 107 MG/DL (ref 65–99)
HCT VFR BLD AUTO: 41.7 % (ref 37–47)
HDLC SERPL-MCNC: 54 MG/DL
HGB BLD-MCNC: 13.4 G/DL (ref 12–16)
IMM GRANULOCYTES # BLD AUTO: 0.01 K/UL (ref 0–0.11)
IMM GRANULOCYTES NFR BLD AUTO: 0.3 % (ref 0–0.9)
LDLC SERPL CALC-MCNC: 97 MG/DL
LYMPHOCYTES # BLD AUTO: 1.22 K/UL (ref 1–4.8)
LYMPHOCYTES NFR BLD: 31 % (ref 22–41)
MCH RBC QN AUTO: 30.7 PG (ref 27–33)
MCHC RBC AUTO-ENTMCNC: 32.1 G/DL (ref 33.6–35)
MCV RBC AUTO: 95.4 FL (ref 81.4–97.8)
MONOCYTES # BLD AUTO: 0.38 K/UL (ref 0–0.85)
MONOCYTES NFR BLD AUTO: 9.7 % (ref 0–13.4)
NEUTROPHILS # BLD AUTO: 2.19 K/UL (ref 2–7.15)
NEUTROPHILS NFR BLD: 55.7 % (ref 44–72)
NRBC # BLD AUTO: 0 K/UL
NRBC BLD-RTO: 0 /100 WBC
PLATELET # BLD AUTO: 254 K/UL (ref 164–446)
PMV BLD AUTO: 11.4 FL (ref 9–12.9)
POTASSIUM SERPL-SCNC: 4.2 MMOL/L (ref 3.6–5.5)
PROT SERPL-MCNC: 7.3 G/DL (ref 6–8.2)
RBC # BLD AUTO: 4.37 M/UL (ref 4.2–5.4)
SODIUM SERPL-SCNC: 139 MMOL/L (ref 135–145)
TRIGL SERPL-MCNC: 146 MG/DL (ref 0–149)
TSH SERPL DL<=0.005 MIU/L-ACNC: 1.22 UIU/ML (ref 0.38–5.33)
WBC # BLD AUTO: 3.9 K/UL (ref 4.8–10.8)

## 2018-10-11 PROCEDURE — 85025 COMPLETE CBC W/AUTO DIFF WBC: CPT

## 2018-10-11 PROCEDURE — 84443 ASSAY THYROID STIM HORMONE: CPT

## 2018-10-11 PROCEDURE — 80053 COMPREHEN METABOLIC PANEL: CPT

## 2018-10-11 PROCEDURE — 36415 COLL VENOUS BLD VENIPUNCTURE: CPT

## 2018-10-11 PROCEDURE — 82306 VITAMIN D 25 HYDROXY: CPT

## 2018-10-11 PROCEDURE — 80061 LIPID PANEL: CPT

## 2018-10-22 RX ORDER — LEVOTHYROXINE SODIUM 0.1 MG/1
TABLET ORAL
Qty: 30 TAB | Refills: 5 | Status: SHIPPED | OUTPATIENT
Start: 2018-10-22 | End: 2019-05-06 | Stop reason: SDUPTHER

## 2019-03-27 ENCOUNTER — OFFICE VISIT (OUTPATIENT)
Dept: MEDICAL GROUP | Facility: PHYSICIAN GROUP | Age: 64
End: 2019-03-27
Payer: COMMERCIAL

## 2019-03-27 VITALS
HEART RATE: 70 BPM | TEMPERATURE: 98.4 F | BODY MASS INDEX: 34.21 KG/M2 | HEIGHT: 68 IN | DIASTOLIC BLOOD PRESSURE: 70 MMHG | OXYGEN SATURATION: 97 % | SYSTOLIC BLOOD PRESSURE: 130 MMHG | WEIGHT: 225.75 LBS

## 2019-03-27 DIAGNOSIS — E03.9 HYPOTHYROIDISM, UNSPECIFIED TYPE: ICD-10-CM

## 2019-03-27 DIAGNOSIS — E78.5 DYSLIPIDEMIA: ICD-10-CM

## 2019-03-27 DIAGNOSIS — E55.9 VITAMIN D DEFICIENCY: ICD-10-CM

## 2019-03-27 DIAGNOSIS — N95.1 SYMPTOMATIC MENOPAUSAL OR FEMALE CLIMACTERIC STATES: ICD-10-CM

## 2019-03-27 DIAGNOSIS — D70.9 NEUTROPENIA, UNSPECIFIED TYPE (HCC): ICD-10-CM

## 2019-03-27 PROCEDURE — 99214 OFFICE O/P EST MOD 30 MIN: CPT | Performed by: FAMILY MEDICINE

## 2019-03-27 NOTE — PROGRESS NOTES
"Subjective:      Melissa Escamilla is a 63 y.o. female who presents with Hyperlipidemia        HPI:    Patient returns for follow-up of her medical problems.    Patient reports new onset dizziness when standing from lying down. She has also noticed an intermittent abnormal heart fluttering which is quick to resolve. She drinks 2 cups of coffee and a diet pepsi daily.    Dyslipidemia  She takes simvastatin 20 mg for her dyslipidemia. She has been compliant and denies any side effects.    Hypothyroidism  She continues to take levothyroxine 100 mcg for treatment of her hypothyroidism. She denies any side effects.     Vitamin D deficiency  She is compliant with her vitamin D 5000 UI.     Neutropenia  She was seen and evaluated by a hematologist for Neutropenia.  Further workup did not show any etiology and hematologist thought it was autoimmune related to her hypothyroidism and granuloma annualare    Symptomatic menopausal or female climacteric states  She takes sertraline 50 mg as prescribed with good results. She would like to discontinue use once she retires as she has experienced difficulty losing weight with the medication. She has attempted a strict diet regimen of 700 calories daily which will only allow for 1-2 lbs of weight loss before gaining weight again.     Health maintenance  Patient is due for a PAP smear. She is up to date on her vaccinations.    Past medical history, past surgical history, family history reviewed-no changes    Social history reviewed-no changes    Allergies reviewed-no changes    Medications reviewed-no changes     ROS:  As per the HPI as shown above, the rest are negative.       Objective:     /70 (BP Location: Left arm, Patient Position: Sitting, BP Cuff Size: Adult)   Pulse 70   Temp 36.9 °C (98.4 °F) (Temporal)   Ht 1.727 m (5' 8\")   Wt 102.4 kg (225 lb 12 oz)   SpO2 97%   BMI 34.33 kg/m²     Physical Exam  Examined alert, awake, oriented, not in distress    Neck-supple, " no lymphadenopathy, no thyromegaly  Lungs-clear to auscultation, no rales, no wheezes  Heart-regular rate and rhythm, no murmur  Extremities-no edema, clubbing, cyanosis      Labs:  No visits with results within 5 Month(s) from this visit.   Latest known visit with results is:   Hospital Outpatient Visit on 10/11/2018   Component Date Value Ref Range Status   • Cholesterol,Tot 10/11/2018 180  100 - 199 mg/dL Final   • Triglycerides 10/11/2018 146  0 - 149 mg/dL Final   • HDL 10/11/2018 54  >=40 mg/dL Final   • LDL 10/11/2018 97  <100 mg/dL Final   • Sodium 10/11/2018 139  135 - 145 mmol/L Final   • Potassium 10/11/2018 4.2  3.6 - 5.5 mmol/L Final   • Chloride 10/11/2018 107  96 - 112 mmol/L Final   • Co2 10/11/2018 26  20 - 33 mmol/L Final   • Anion Gap 10/11/2018 6.0  0.0 - 11.9 Final   • Glucose 10/11/2018 107* 65 - 99 mg/dL Final   • Bun 10/11/2018 22  8 - 22 mg/dL Final   • Creatinine 10/11/2018 0.81  0.50 - 1.40 mg/dL Final   • Calcium 10/11/2018 9.3  8.5 - 10.5 mg/dL Final   • AST(SGOT) 10/11/2018 15  12 - 45 U/L Final   • ALT(SGPT) 10/11/2018 18  2 - 50 U/L Final   • Alkaline Phosphatase 10/11/2018 63  30 - 99 U/L Final   • Total Bilirubin 10/11/2018 0.5  0.1 - 1.5 mg/dL Final   • Albumin 10/11/2018 4.5  3.2 - 4.9 g/dL Final   • Total Protein 10/11/2018 7.3  6.0 - 8.2 g/dL Final   • Globulin 10/11/2018 2.8  1.9 - 3.5 g/dL Final   • A-G Ratio 10/11/2018 1.6  g/dL Final   • WBC 10/11/2018 3.9* 4.8 - 10.8 K/uL Final   • RBC 10/11/2018 4.37  4.20 - 5.40 M/uL Final   • Hemoglobin 10/11/2018 13.4  12.0 - 16.0 g/dL Final   • Hematocrit 10/11/2018 41.7  37.0 - 47.0 % Final   • MCV 10/11/2018 95.4  81.4 - 97.8 fL Final   • MCH 10/11/2018 30.7  27.0 - 33.0 pg Final   • MCHC 10/11/2018 32.1* 33.6 - 35.0 g/dL Final   • RDW 10/11/2018 46.8  35.9 - 50.0 fL Final   • Platelet Count 10/11/2018 254  164 - 446 K/uL Final   • MPV 10/11/2018 11.4  9.0 - 12.9 fL Final   • Neutrophils-Polys 10/11/2018 55.70  44.00 - 72.00 % Final    • Lymphocytes 10/11/2018 31.00  22.00 - 41.00 % Final   • Monocytes 10/11/2018 9.70  0.00 - 13.40 % Final   • Eosinophils 10/11/2018 2.80  0.00 - 6.90 % Final   • Basophils 10/11/2018 0.50  0.00 - 1.80 % Final   • Immature Granulocytes 10/11/2018 0.30  0.00 - 0.90 % Final   • Nucleated RBC 10/11/2018 0.00  /100 WBC Final   • Neutrophils (Absolute) 10/11/2018 2.19  2.00 - 7.15 K/uL Final    Includes immature neutrophils, if present.   • Lymphs (Absolute) 10/11/2018 1.22  1.00 - 4.80 K/uL Final   • Monos (Absolute) 10/11/2018 0.38  0.00 - 0.85 K/uL Final   • Eos (Absolute) 10/11/2018 0.11  0.00 - 0.51 K/uL Final   • Baso (Absolute) 10/11/2018 0.02  0.00 - 0.12 K/uL Final   • Immature Granulocytes (abs) 10/11/2018 0.01  0.00 - 0.11 K/uL Final   • NRBC (Absolute) 10/11/2018 0.00  K/uL Final   • TSH 10/11/2018 1.220  0.380 - 5.330 uIU/mL Final    Comment: Please note new reference ranges effective 12/14/2017 10:00 AM  Pregnant Females, 1st Trimester  0.050-3.700  Pregnant Females, 2nd Trimester  0.310-4.350  Pregnant Females, 3rd Trimester  0.410-5.180     • 25-Hydroxy   Vitamin D 25 10/11/2018 32  30 - 100 ng/mL Final    Comment: Adult Ranges:   <20 ng/mL - Deficiency  20-29 ng/mL - Insufficiency   ng/mL - Sufficiency  The [employer] Vitamin D Assay is standardized to the  [facility] reference measurement procedures, a  reference method for the Vitamin D Standardization Program  (VDSP).  The VDSP aligns patient results among 25 (OH)  Vitamin D methods.     • Fasting Status 10/11/2018 Fasting   Final   • GFR If  10/11/2018 >60  >60 mL/min/1.73 m 2 Final   • GFR If Non  10/11/2018 >60  >60 mL/min/1.73 m 2 Final             Assessment/Plan:     1. Dyslipidemia  Lipid profile in October 2018 shows a total cholesterol of 180, triglycerides of 146, HDL of 54, and LDL of 97. These are all within normal ranges. She will continue treatment with simvastatin.  - TSH; Future  - Lipid  Profile; Future  - Comp Metabolic Panel; Future  - CBC WITH DIFFERENTIAL; Future  - VITAMIN D,25 HYDROXY; Future    2. Hypothyroidism, unspecified type  TSH in October 2018 1.220. Advised to continue her regular levothyroxine. Will continue to monitor.  Recheck TSH in 6 months.  - TSH; Future  - Lipid Profile; Future  - Comp Metabolic Panel; Future  - CBC WITH DIFFERENTIAL; Future  - VITAMIN D,25 HYDROXY; Future    3. Vitamin D deficiency  Vitamin D on 1/11/19 was 32 which is within normal ranges. Will continue to monitor with continued vitamin D supplementation.   - TSH; Future  - Lipid Profile; Future  - Comp Metabolic Panel; Future  - CBC WITH DIFFERENTIAL; Future  - VITAMIN D,25 HYDROXY; Future    4. Neutropenia, unspecified type (HCC)  Stable mild chronic neutropenia.  No etiology found on workup by hematologist.  WBC on 10/11/18 was 3.9 which is still slightly but stable.  We will continue to follow closely with a CBC next visit.  - TSH; Future  - Lipid Profile; Future  - Comp Metabolic Panel; Future  - CBC WITH DIFFERENTIAL; Future  - VITAMIN D,25 HYDROXY; Future    5. Symptomatic menopausal or female climacteric states  When she is ready to be taken off sertraline she will let me know so we can slowly taper the dose down before discontinuing it.  - TSH; Future  - Lipid Profile; Future  - Comp Metabolic Panel; Future  - CBC WITH DIFFERENTIAL; Future  - VITAMIN D,25 HYDROXY; Future    6. BMI 34.0-34.9,adult  Patient's body mass index is 34.33 kg/m². She has attempted a strict diet regimen which has not helped. Exercise and nutrition counseling were performed at this visit.  We discussed referral to physician managed weight loss program but she declined.  I discussed referral to our dietitian which she also declined.  She said she is not sure if insurance covers and she wants to hold off until the next visit.  She was given diet sheet to follow for low calorie diet.  Instructed to find out how many calories she  consumes a day and she can decrease the amount of calories by 200/day every few weeks to achieve weight loss.  Advised regular exercises.  Patient's body mass index is 34.33 kg/m². Exercise and nutrition counseling were performed at this visit.    Other orders  - Cyanocobalamin (VITAMIN B12 PO); Take  by mouth.    Patient will follow up in 6 months.    Rickey ROMAN (Scribe), am scribing for, and in the presence of, Jennifer Pino MD     Electronically signed by: Rickey Castellanos (In[place]), 3/27/2019    Jennifer ROMAN MD personally performed the services described in this documentation, as scribed by Rickey Castellanos in my presence, and it is both accurate and complete.

## 2019-05-06 RX ORDER — LEVOTHYROXINE SODIUM 0.1 MG/1
100 TABLET ORAL
Qty: 30 TAB | Refills: 5 | Status: SHIPPED | OUTPATIENT
Start: 2019-05-06 | End: 2019-11-19 | Stop reason: SDUPTHER

## 2019-05-20 DIAGNOSIS — E78.5 DYSLIPIDEMIA: ICD-10-CM

## 2019-05-20 RX ORDER — SIMVASTATIN 20 MG
TABLET ORAL
Qty: 30 TAB | Refills: 5 | Status: SHIPPED | OUTPATIENT
Start: 2019-05-20 | End: 2020-02-18

## 2019-08-12 ENCOUNTER — OFFICE VISIT (OUTPATIENT)
Dept: MEDICAL GROUP | Facility: PHYSICIAN GROUP | Age: 64
End: 2019-08-12
Payer: COMMERCIAL

## 2019-08-12 VITALS
BODY MASS INDEX: 33.41 KG/M2 | SYSTOLIC BLOOD PRESSURE: 120 MMHG | WEIGHT: 220.46 LBS | OXYGEN SATURATION: 95 % | HEIGHT: 68 IN | HEART RATE: 64 BPM | TEMPERATURE: 97.5 F | DIASTOLIC BLOOD PRESSURE: 70 MMHG

## 2019-08-12 DIAGNOSIS — J06.9 ACUTE URI: ICD-10-CM

## 2019-08-12 PROCEDURE — 99213 OFFICE O/P EST LOW 20 MIN: CPT | Performed by: FAMILY MEDICINE

## 2019-08-12 ASSESSMENT — PATIENT HEALTH QUESTIONNAIRE - PHQ9: CLINICAL INTERPRETATION OF PHQ2 SCORE: 0

## 2019-08-12 NOTE — PROGRESS NOTES
"Subjective:      Melissa Escamilla is a 64 y.o. female who presents with Sinusitis        HPI:  The patient presents with an acute complaint of right sided throat pain onset yesterday. She states the pain radiates up the right side of her face and into her right ear. She has associated symptoms of dry cough, headache, sweats, chills, and nasal pain. She states she was supposed to go on vacation today by train to [place], but cancelled her trip yesterday due to how bad she felt.  This morning the ear pain, throat pain resolved. She notes the pain inside her nasal passages has been present all summer due to the dry weather, and was exacerbated by her other symptoms. She denies fever, sinus pressure, rhinorrhea, or sputum production. She also denies sick contacts.       Past medical history, past surgical history, family history reviewed-no changes    Social history reviewed-no changes    Allergies reviewed-no changes    Medications reviewed-no changes        ROS:  As per the HPI as shown above, the rest are negative.       Objective:     /70 (BP Location: Left arm, Patient Position: Sitting, BP Cuff Size: Adult)   Pulse 64   Temp 36.4 °C (97.5 °F) (Temporal)   Ht 1.727 m (5' 8\")   Wt 100 kg (220 lb 7.4 oz)   SpO2 95%   BMI 33.52 kg/m²     Physical Exam  Examined alert, awake, oriented, not in distress    Ears-bilateral TM intact without signs of infection  Nose-turbinates normal without swelling, discharge, or obstruction   Throat-tonsils are normal without enlargement, exudate, or erythema  Neck-supple, no lymphadenopathy, no thyromegaly  Lungs-clear to auscultation, no rales, no wheezes  Heart-regular rate and rhythm, no murmur  Extremities-no edema, clubbing, cyanosis       Assessment/Plan:     1. Acute URI  Given the patient's presentation and physical exam I doubt sinusitis or other bacterial infection. Her symptoms are likely due to a viral process. I advised her to rest and drink plenty of fluids. I " instructed her to purchase Mucinex-D OTC, and to gargle warm salt water after eating. If her symptoms persist, or if she starts having yellow/green sputum production/nasal discharge in the next 2 to 3 days, she will contact me.       Follow up with me as scheduled in October.      Tanisha ROMAN (Scribe), am scribing for, and in the presence of, Jennifer Pino MD     Electronically signed by: Tanisha Hebert (Joseloibe), 8/12/2019    Jennifer ROMAN MD personally performed the services described in this documentation, as scribed by Tanisha Hebert in my presence, and it is both accurate and complete.

## 2019-09-18 ENCOUNTER — HOSPITAL ENCOUNTER (OUTPATIENT)
Dept: RADIOLOGY | Facility: MEDICAL CENTER | Age: 64
End: 2019-09-18
Attending: FAMILY MEDICINE
Payer: COMMERCIAL

## 2019-10-16 ENCOUNTER — OFFICE VISIT (OUTPATIENT)
Dept: MEDICAL GROUP | Facility: PHYSICIAN GROUP | Age: 64
End: 2019-10-16
Payer: COMMERCIAL

## 2019-10-16 VITALS
TEMPERATURE: 98.3 F | DIASTOLIC BLOOD PRESSURE: 80 MMHG | WEIGHT: 223.11 LBS | SYSTOLIC BLOOD PRESSURE: 130 MMHG | HEIGHT: 68 IN | BODY MASS INDEX: 33.81 KG/M2 | OXYGEN SATURATION: 93 % | HEART RATE: 67 BPM

## 2019-10-16 DIAGNOSIS — Z23 NEED FOR IMMUNIZATION AGAINST INFLUENZA: ICD-10-CM

## 2019-10-16 DIAGNOSIS — E03.9 HYPOTHYROIDISM, UNSPECIFIED TYPE: ICD-10-CM

## 2019-10-16 DIAGNOSIS — E78.5 DYSLIPIDEMIA: ICD-10-CM

## 2019-10-16 DIAGNOSIS — R73.01 IMPAIRED FASTING BLOOD SUGAR: ICD-10-CM

## 2019-10-16 DIAGNOSIS — D70.9 NEUTROPENIA, UNSPECIFIED TYPE (HCC): ICD-10-CM

## 2019-10-16 DIAGNOSIS — N95.1 SYMPTOMATIC MENOPAUSAL OR FEMALE CLIMACTERIC STATES: ICD-10-CM

## 2019-10-16 PROCEDURE — 90471 IMMUNIZATION ADMIN: CPT | Performed by: FAMILY MEDICINE

## 2019-10-16 PROCEDURE — 99214 OFFICE O/P EST MOD 30 MIN: CPT | Mod: 25 | Performed by: FAMILY MEDICINE

## 2019-10-16 PROCEDURE — 90686 IIV4 VACC NO PRSV 0.5 ML IM: CPT | Performed by: FAMILY MEDICINE

## 2019-10-16 NOTE — PROGRESS NOTES
Subjective:      Melissa Escamilla is a 64 y.o. female who presents with Diabetes    HPI:    The patient is here for follow up on her chronic medical problems.    Dyslipidemia  She has been taking Simvastatin 20 mg as prescribed for her dyslipidemia without myalgias or other side effects. Blood work was completed prior to this visit. She states that she forgot to take her medication for a few nights before her lab work.     Hypothyroidism  Status post radioactive iodine treatment for Graves' disease.  Patient continues to take thyroid replacement medication as prescribed. Blood work was completed prior to this visit.    Vitamin D deficiency  She is unsure, but she believes she takes 2000 IU's of OTC Vitamin D supplementation. Blood work was completed prior to this visit.     Symptomatic menopausal or female climacteric states  She takes sertraline 50 mg as prescribed with good results.     Neutropenia  This is a chronic issue which we have been following. Patient was previously seen and evaluated by a hematologist for neutropenia. Work-up did not show any etiology, and her hematologist thought this was likely autoimmune, secondary to her hypothyroidism and granuloma annualare. Blood work was completed prior to this visit.     Impaired fasting blood sugar  We have previously seen elevated glucose levels, but her A1C has always been normal. We have also seen levels within normal limits. Patient states that she tries to watch her diet by including plenty of vegetables, but she occasionally over-eats.    Health Maintenance  Patient is a requesting a flu shot today.       Past medical history, past surgical history, family history reviewed-no changes    Social history reviewed-no changes    Allergies reviewed-no changes    Medications reviewed-no changes      ROS:  As per the HPI as shown above, the rest are negative.       Objective:     /80 (BP Location: Left arm, Patient Position: Sitting, BP Cuff Size: Adult)    "Pulse 67   Temp 36.8 °C (98.3 °F) (Temporal)   Ht 1.727 m (5' 8\")   Wt 101.2 kg (223 lb 1.7 oz)   SpO2 93%   BMI 33.92 kg/m²     Physical Exam    Examined alert, awake, oriented, not in distress    Neck-supple, no lymphadenopathy, no thyromegaly  Lungs-clear to auscultation, no rales, no wheezes  Heart-regular rate and rhythm, no murmur  Extremities-no edema, clubbing, cyanosis       Labs: (through [facility])  Lipid Panel:   TC: 206 (H)  HDL: 54  Triglycerides: 150 (H)  LDL: 126 (H)    CMP:  Glucose: 105 (H)  The rest of the CMP is within normal limits.     TSH: 1.19    Vitamin D: 33     CBC:   WBC: 4.2  Entirely within normal limits.     Assessment/Plan:     1. Dyslipidemia  Her lipid panel has showed increases in her TC from 180 to 206, triglycerides from 146 to 150, and LDL from 97 to 126. These are now all elevated above normal limits. HDL is normal and unchanged at 54.  Advised to take her simvastatin regularly every night, avoid fatty foods, exercise regularly and continue to lose weight.  She will continue her current Simvastatin dosage and complete repeat lab work.  - HEMOGLOBIN A1C; Future  - Lipid Profile; Future  - Comp Metabolic Panel; Future  - CBC WITH DIFFERENTIAL; Future  - TSH; Future    2. Hypothyroidism, unspecified type  Stable on thyroid replacement medications. TSH is adequately replaced at 1.19. We will continue the current plan of care. Labs have been ordered for the next follow up visit.  - HEMOGLOBIN A1C; Future  - Lipid Profile; Future  - Comp Metabolic Panel; Future  - CBC WITH DIFFERENTIAL; Future  - TSH; Future    3. Neutropenia, unspecified type (HCC)  Stable mild chronic neutropenia. Hematology did not find any etiology upon further work-up. WBC count is improved from 3.9 to 4.2 which is within normal limits per [facility] guidelines. We will continue to follow-up on this with repeat lab work.  - HEMOGLOBIN A1C; Future  - Lipid Profile; Future  - Comp Metabolic Panel; Future  - " CBC WITH DIFFERENTIAL; Future  - TSH; Future    4. Impaired fasting blood sugar  This is a chronic issue which we have seen intermittently. All prior A1C testing has been normal. She will continue to manage this through her own efforts. Labs have been ordered for the next follow up visit.   - HEMOGLOBIN A1C; Future  - Lipid Profile; Future  - Comp Metabolic Panel; Future  - CBC WITH DIFFERENTIAL; Future  - TSH; Future    5. Symptomatic menopausal or female climacteric states  Chronic, stable on Zoloft. Continue current dosage and follow-up.     6. Need for immunization against influenza  Flu vaccination given in the office today.   - Influenza Vaccine Quad Injection (PF)         Jesse ROMAN (Scribe), am scribing for, and in the presence of, Jennifer Pino MD     Electronically signed by: Jesse Mitchell (Scribe), 10/16/2019    IJennifer MD personally performed the services described in this documentation, as scribed by Jesse Mitchell in my presence, and it is both accurate and complete.

## 2019-11-19 RX ORDER — LEVOTHYROXINE SODIUM 0.1 MG/1
TABLET ORAL
Qty: 30 TAB | Refills: 5 | Status: SHIPPED | OUTPATIENT
Start: 2019-11-19 | End: 2020-06-23

## 2019-12-17 DIAGNOSIS — N95.1 SYMPTOMATIC MENOPAUSAL OR FEMALE CLIMACTERIC STATES: ICD-10-CM

## 2020-06-23 RX ORDER — LEVOTHYROXINE SODIUM 0.1 MG/1
TABLET ORAL
Qty: 30 TAB | Refills: 4 | Status: SHIPPED | OUTPATIENT
Start: 2020-06-23 | End: 2020-12-30

## 2020-06-24 ENCOUNTER — OFFICE VISIT (OUTPATIENT)
Dept: MEDICAL GROUP | Facility: PHYSICIAN GROUP | Age: 65
End: 2020-06-24
Payer: COMMERCIAL

## 2020-06-24 VITALS
SYSTOLIC BLOOD PRESSURE: 120 MMHG | OXYGEN SATURATION: 97 % | HEART RATE: 71 BPM | DIASTOLIC BLOOD PRESSURE: 70 MMHG | WEIGHT: 221.56 LBS | TEMPERATURE: 98.7 F | HEIGHT: 68 IN | BODY MASS INDEX: 33.58 KG/M2

## 2020-06-24 DIAGNOSIS — D70.9 NEUTROPENIA, UNSPECIFIED TYPE (HCC): ICD-10-CM

## 2020-06-24 DIAGNOSIS — N95.1 SYMPTOMATIC MENOPAUSAL OR FEMALE CLIMACTERIC STATES: ICD-10-CM

## 2020-06-24 DIAGNOSIS — R73.01 IMPAIRED FASTING BLOOD SUGAR: ICD-10-CM

## 2020-06-24 DIAGNOSIS — G89.29 CHRONIC PAIN OF RIGHT THUMB: ICD-10-CM

## 2020-06-24 DIAGNOSIS — E55.9 VITAMIN D DEFICIENCY: ICD-10-CM

## 2020-06-24 DIAGNOSIS — E78.5 DYSLIPIDEMIA: ICD-10-CM

## 2020-06-24 DIAGNOSIS — E03.9 HYPOTHYROIDISM, UNSPECIFIED TYPE: ICD-10-CM

## 2020-06-24 DIAGNOSIS — Z11.59 NEED FOR HEPATITIS C SCREENING TEST: ICD-10-CM

## 2020-06-24 DIAGNOSIS — M79.644 CHRONIC PAIN OF RIGHT THUMB: ICD-10-CM

## 2020-06-24 PROCEDURE — 99214 OFFICE O/P EST MOD 30 MIN: CPT | Performed by: FAMILY MEDICINE

## 2020-06-24 ASSESSMENT — FIBROSIS 4 INDEX: FIB4 SCORE: 0.86

## 2020-06-24 ASSESSMENT — PATIENT HEALTH QUESTIONNAIRE - PHQ9: CLINICAL INTERPRETATION OF PHQ2 SCORE: 0

## 2020-06-24 NOTE — LETTER
[facility]  Jennifer Pino M.D.  [address]  [place] [address]  Fax: [phone]   Authorization for Release/Disclosure of   Protected Health Information   Name: SAMMI FUNEZ : 1955 SSN: [id number]   Address: [address] [place] [address] Phone:    [phone] (home)    I authorize the entity listed below to release/disclose the PHI below to:   [facility]/Jennifer Pnio M.D. and Jennifer Pino M.D.   Provider or Entity Name:    [facility]   Address   City, State, [facility]   Phone:      Fax:     Reason for request: continuity of care   Information to be released:    [  ] LAST COLONOSCOPY,  including any PATH REPORT and follow-up  [  ] LAST FIT/COLOGUARD RESULT [  ] LAST DEXA  [x  ] LAST MAMMOGRAM  [  ] LAST PAP  [  ] LAST LABS [  ] RETINA EXAM REPORT  [  ] IMMUNIZATION RECORDS  [  ] Release all info      [  ] Check here and initial the line next to each item to release ALL health information INCLUDING  _____ Care and treatment for drug and / or alcohol abuse  _____ HIV testing, infection status, or AIDS  _____ Genetic Testing    DATES OF SERVICE OR TIME PERIOD TO BE DISCLOSED: _____________  I understand and acknowledge that:  * This Authorization may be revoked at any time by you in writing, except if your health information has already been used or disclosed.  * Your health information that will be used or disclosed as a result of you signing this authorization could be re-disclosed by the recipient. If this occurs, your re-disclosed health information may no longer be protected by State or Federal laws.  * You may refuse to sign this Authorization. Your refusal will not affect your ability to obtain treatment.  * This Authorization becomes effective upon signing and will  on (date) __________.      If no date is indicated, this Authorization will  one (1) year from the signature date.    Name: Sammi Funez    Signature:   Date:     2020       PLEASE FAX  REQUESTED RECORDS BACK TO: [phone]

## 2020-06-24 NOTE — LETTER
[facility]  Jennifer Pino M.D.  [address]  [place] [address]  Fax: [phone]   Authorization for Release/Disclosure of   Protected Health Information   Name: SAMMI FUNEZ : 1955 SSN: [id number]   Address: [address] [place] [address] Phone:    [phone] (home)    I authorize the entity listed below to release/disclose the PHI below to:   [facility]/Jennifer Pino M.D. and Jennifer Pino M.D.   Provider or Entity Name:    Dr. Carvajal   Address   City, [facility]   Phone:      Fax:     Reason for request: continuity of care   Information to be released:    [  ] LAST COLONOSCOPY,  including any PATH REPORT and follow-up  [  ] LAST FIT/COLOGUARD RESULT [  ] LAST DEXA  [  ] LAST MAMMOGRAM  [x  ] LAST PAP  [  ] LAST LABS [  ] RETINA EXAM REPORT  [  ] IMMUNIZATION RECORDS  [  ] Release all info      [  ] Check here and initial the line next to each item to release ALL health information INCLUDING  _____ Care and treatment for drug and / or alcohol abuse  _____ HIV testing, infection status, or AIDS  _____ Genetic Testing    DATES OF SERVICE OR TIME PERIOD TO BE DISCLOSED: _____________  I understand and acknowledge that:  * This Authorization may be revoked at any time by you in writing, except if your health information has already been used or disclosed.  * Your health information that will be used or disclosed as a result of you signing this authorization could be re-disclosed by the recipient. If this occurs, your re-disclosed health information may no longer be protected by State or Federal laws.  * You may refuse to sign this Authorization. Your refusal will not affect your ability to obtain treatment.  * This Authorization becomes effective upon signing and will  on (date) __________.      If no date is indicated, this Authorization will  one (1) year from the signature date.    Name: Sammi Funez    Signature:   Date:     2020       PLEASE FAX REQUESTED  RECORDS BACK TO: [phone]

## 2020-06-25 NOTE — PROGRESS NOTES
"Subjective:      Melissa Escamilla is a 64 y.o. female who presents with Hyperlipidemia            HPI     Patient returns for follow-up of her medical problems.    In terms of the dyslipidemia, she continues to take simvastatin.      She continues to take thyroid replacement for post radioactive iodine hypothyroidism.    She continues to take vitamin D supplementation for vitamin D deficiency for the last vitamin D level came back at goal in October 2019.    For her menopausal symptoms, she continues to take sertraline to manage this with good results.    We follow her for impaired fasting blood sugar and she manages this by watching her diet.    We have been following her for mild neutropenia.  Previous work-up by hematologist did not reveal any etiology.  Her numbers have been stable over the years.      She complains of pain of the right thumb for a while now.  Denies any trauma.  She said she has difficulty opening jars because of the pain.    Past medical history, past surgical history, family history reviewed-no changes    Social history reviewed-no changes    Allergies reviewed-no changes    Medications reviewed-no changes    ROS     As per HPI, the rest are negative       Objective:     /70 (BP Location: Left arm, Patient Position: Sitting, BP Cuff Size: Adult)   Pulse 71   Temp 37.1 °C (98.7 °F) (Temporal)   Ht 1.727 m (5' 8\")   Wt 100.5 kg (221 lb 9 oz)   SpO2 97%   BMI 33.69 kg/m²      Physical Exam     Examined alert, awake, oriented, not in distress    Neck-supple, no lymphadenopathy, no thyromegaly  Lungs-clear to auscultation, no rales, no wheezes  Heart-regular rate and rhythm, no murmur  Extremities-no edema, clubbing, cyanosis, right thumb without any swelling, skin changes, negative Finkelstein's test, positive tenderness on palpation of the MCP joint.       Labs done at [facility] on 6/12/2020    Total cholesterol 192, HDL 60,     Fasting blood sugar 103, the rest of " the CMP within normal limits    Hemoglobin A1c 5.4    TSH 2.55    CBC within normal limits including WBC count 3.9K     Assessment/Plan:       1. Dyslipidemia  Mildly elevated LDL cholesterol.  Advised to work harder on watching the fatty foods.  Continue simvastatin.    - TSH; Future  - Lipid Profile; Future  - Comp Metabolic Panel; Future  - HEMOGLOBIN A1C; Future  - VITAMIN D,25 HYDROXY; Future  - HEP C VIRUS ANTIBODY; Future    2. Hypothyroidism, unspecified type  Adequately replaced.  Continue the same dose of levothyroxine.  She will do follow-up TSH next visit.  - TSH; Future  - Lipid Profile; Future  - Comp Metabolic Panel; Future  - HEMOGLOBIN A1C; Future  - VITAMIN D,25 HYDROXY; Future  - HEP C VIRUS ANTIBODY; Future    3. Vitamin D deficiency  Continue vitamin D supplementation and we will do updated vitamin D level.  - TSH; Future  - Lipid Profile; Future  - Comp Metabolic Panel; Future  - HEMOGLOBIN A1C; Future  - VITAMIN D,25 HYDROXY; Future  - HEP C VIRUS ANTIBODY; Future    4. Impaired fasting blood sugar  Fasting blood glucose borderline elevated but hemoglobin A1c is normal.  This is managed with her own efforts.  We will do follow-up blood work next visit.  - TSH; Future  - Lipid Profile; Future  - Comp Metabolic Panel; Future  - HEMOGLOBIN A1C; Future  - VITAMIN D,25 HYDROXY; Future  - HEP C VIRUS ANTIBODY; Future    5. Symptomatic menopausal or female climacteric states  Stable and under control on sertraline.    6. Neutropenia, unspecified type (HCC)  Mild neutropenia over the years with no etiology found per work-up by hematologist.  Most recent CBC came back normal WBC count.  We will continue to follow.    7. Chronic pain of right thumb  This may be due to osteoarthritis.  Offered referral to orthopedist for further evaluation and treatment.  She may need intra-articular joint injection.  Patient agrees to proceed and referral was placed.  Advised warm compresses and over-the-counter NSAIDs  as needed.  - REFERRAL TO ORTHOPEDICS    8. Need for hepatitis C screening test  She qualifies for hepatitis C screening based on CDC guidelines and this was ordered.  - TSH; Future  - Lipid Profile; Future  - Comp Metabolic Panel; Future  - HEMOGLOBIN A1C; Future  - VITAMIN D,25 HYDROXY; Future  - HEP C VIRUS ANTIBODY; Future    Follow-up in 6 months or sooner if needed.      Please note that this dictation was created using voice recognition software. I have worked with consultants from the vendor as well as technical experts from [employer] [facility]  [employer] to optimize the interface. I have made every reasonable attempt to correct obvious errors, but I expect that there are errors of grammar and possibly content I did not discover before finalizing the note.

## 2020-07-30 DIAGNOSIS — N95.1 SYMPTOMATIC MENOPAUSAL OR FEMALE CLIMACTERIC STATES: ICD-10-CM

## 2020-11-16 ENCOUNTER — OFFICE VISIT (OUTPATIENT)
Dept: MEDICAL GROUP | Facility: PHYSICIAN GROUP | Age: 65
End: 2020-11-16
Payer: OTHER MISCELLANEOUS

## 2020-11-16 VITALS
HEIGHT: 68 IN | DIASTOLIC BLOOD PRESSURE: 60 MMHG | OXYGEN SATURATION: 98 % | SYSTOLIC BLOOD PRESSURE: 120 MMHG | TEMPERATURE: 98.3 F | BODY MASS INDEX: 33.91 KG/M2 | WEIGHT: 223.77 LBS | HEART RATE: 73 BPM

## 2020-11-16 DIAGNOSIS — Z23 NEED FOR IMMUNIZATION AGAINST INFLUENZA: ICD-10-CM

## 2020-11-16 DIAGNOSIS — H01.133 ECZEMA OF EYELID, RIGHT: ICD-10-CM

## 2020-11-16 DIAGNOSIS — G90.2 HORNER'S SYNDROME: ICD-10-CM

## 2020-11-16 PROCEDURE — 99214 OFFICE O/P EST MOD 30 MIN: CPT | Mod: 25 | Performed by: FAMILY MEDICINE

## 2020-11-16 PROCEDURE — 90471 IMMUNIZATION ADMIN: CPT | Performed by: FAMILY MEDICINE

## 2020-11-16 PROCEDURE — 90686 IIV4 VACC NO PRSV 0.5 ML IM: CPT | Performed by: FAMILY MEDICINE

## 2020-11-16 RX ORDER — TRIAMCINOLONE ACETONIDE 1 MG/G
CREAM TOPICAL
Qty: 30 G | Refills: 1 | Status: SHIPPED
Start: 2020-11-16 | End: 2021-05-11

## 2020-11-16 NOTE — LETTER
[facility]  Jennifer Pino M.D.  [address]  [place] [address]  Fax: [phone]   Authorization for Release/Disclosure of   Protected Health Information   Name: SAMMI FUNEZ : 1955 SSN: [id number]   Address: [address] [place] [address] Phone:    [phone] (home)    I authorize the entity listed below to release/disclose the PHI below to:   [facility]/Jennifer Pino M.D. and Jennifer Pino M.D.   Provider or Entity Name:    Dr. Bowens   Address   City, State, [facility]   Phone:      Fax:     Reason for request: continuity of care   Information to be released:    [  ] LAST COLONOSCOPY,  including any PATH REPORT and follow-up  [  ] LAST FIT/COLOGUARD RESULT [  ] LAST DEXA  [  ] LAST MAMMOGRAM  [  ] LAST PAP  [ x ] LAST LABS [  ] RETINA EXAM REPORT  [  ] IMMUNIZATION RECORDS  [  ] Release all info      [  ] Check here and initial the line next to each item to release ALL health information INCLUDING  _____ Care and treatment for drug and / or alcohol abuse  _____ HIV testing, infection status, or AIDS  _____ Genetic Testing    DATES OF SERVICE OR TIME PERIOD TO BE DISCLOSED: _____________  I understand and acknowledge that:  * This Authorization may be revoked at any time by you in writing, except if your health information has already been used or disclosed.  * Your health information that will be used or disclosed as a result of you signing this authorization could be re-disclosed by the recipient. If this occurs, your re-disclosed health information may no longer be protected by State or Federal laws.  * You may refuse to sign this Authorization. Your refusal will not affect your ability to obtain treatment.  * This Authorization becomes effective upon signing and will  on (date) __________.      If no date is indicated, this Authorization will  one (1) year from the signature date.    Name: Sammi Funez    Signature:   Date:     2020       PLEASE FAX REQUESTED  RECORDS BACK TO: [phone]

## 2020-11-17 NOTE — PROGRESS NOTES
Subjective:      Melissa Escamilla is a 65 y.o. female who presents with Hyperlipidemia            HPI     Patient states that she self referred herself to Dr. Azul to get evaluated for lid lift because of droopy eyelids in July 2020.  She said when she was seen and evaluated by Dr. Azul she was found to have anisocoric pupils with the right pupil smaller than the left.  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 Bowens.  According to her Dr. 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  She was then ordered work-up including imaging studies/MRI.  She could not specify what type of MRI but most likely MRI of the brain and possibly MRI of the C-spine.  She is very anxious now because she has been told by Dr. Bowens that this illness could be caused by tumors in the lung including lung cancer and tumors in the brain.  She has looked at her old pictures and she has noticed that the right pupil has been smaller than the left for about 8 years now.  She was reassured by Dr. Bowens that if she has cancer causing the Jerardo syndrome, she would most likely be dead by now since the problem has been ongoing for about 8 years already.  Patient denies any headache, problems with her vision including diplopia, ataxia, dizziness, weight loss, respiratory symptoms.    She is also here because of red, itchy rash involving the right upper lid and the corners of the right eye which has been ongoing for about 2 months now.  She has not used any over-the-counter cream for this problem.    Patient is also due for flu shot today.    Past medical history, past surgical history, family history reviewed-no changes    Social history reviewed-no changes    Allergies reviewed-no changes    Medications reviewed-no changes    ROS     As per HPI, the rest are negative.       Objective:     /60 (BP Location:  "Left arm, Patient Position: Sitting, BP Cuff Size: Adult)   Pulse 73   Temp 36.8 °C (98.3 °F) (Temporal)   Ht 1.727 m (5' 8\")   Wt 101.5 kg (223 lb 12.3 oz)   SpO2 98%   BMI 34.02 kg/m²      Physical Exam     Examined alert, awake, oriented, not in distress    Eyes-erythematous dry, rough textured and scaly rash right upper lid, there is also erythema of the skin of the corners of the right eye, the right pupil is smaller/compared to the left pupil (positive anisocoria) but both pupils are reactive to light, EOMs intact, no conjunctival injection, no eye discharge, mild ptosis of the right eye  Neck-supple, no lymphadenopathy, no thyromegaly  Lungs-clear to auscultation, no rales, no wheezes  Heart-regular rate and rhythm, no murmur  Extremities-no edema, clubbing, cyanosis            Assessment/Plan:        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  I will get records from Dr. Bowens.  I will follow along with Dr. Bowens and I will watch for results of her imaging studies and other tests.  Reassured patient unlikely she has malignant tumor causing the problem because per her old pictures the anisocoria has been ongoing for 8 years.    2. Eczema of eyelid, right  Triamcinolone cream apply thinly twice daily using a Q-tip to right upper lid and avoid the inside of the eyes.    - triamcinolone acetonide (KENALOG) 0.1 % Cream; Apply thinly to affected areas twice dialy  Dispense: 30 g; Refill: 1    3. Need for immunization against influenza  Flu shot was given.  - Influenza Vaccine Quad Injection (PF)    Keep appointment as scheduled in January.      Please note that this dictation was created using voice recognition software. I have worked with consultants from the vendor as well as technical experts from [employer] to optimize the interface. I have made every reasonable attempt to correct obvious errors, but I expect that there are errors " of grammar and possibly content I did not discover before finalizing the note.

## 2021-02-01 ENCOUNTER — APPOINTMENT (OUTPATIENT)
Dept: MEDICAL GROUP | Facility: PHYSICIAN GROUP | Age: 66
End: 2021-02-01
Payer: OTHER MISCELLANEOUS

## 2021-02-08 ENCOUNTER — TELEPHONE (OUTPATIENT)
Dept: MEDICAL GROUP | Facility: PHYSICIAN GROUP | Age: 66
End: 2021-02-08

## 2021-02-08 NOTE — TELEPHONE ENCOUNTER
ESTABLISHED PATIENT PRE-VISIT PLANNING     Patient was NOT contacted to complete PVP.     Note: Patient will not be contacted if there is no indication to call.     1.  Reviewed notes from the last few office visits within the medical group: Yes    2.  If any orders were placed at last visit or intended to be done for this visit (i.e. 6 mos follow-up), do we have Results/Consult Notes?         •  Labs - Labs ordered, NOT completed. Patient advised to complete prior to next appointment.  Note: If patient appointment is for lab review and patient did not complete labs, check with provider if OK to reschedule patient until labs completed.       •  Imaging - Imaging ordered, completed and results are in chart.       •  Referrals - No referrals were ordered at last office visit.    3. Is this appointment scheduled as a Hospital Follow-Up? No    4.  Immunizations were updated in Epic using Reconcile Outside Information activity? No    5.  Patient is due for the following Health Maintenance Topics:   Health Maintenance Due   Topic Date Due   • HEPATITIS C SCREENING  1955   • IMM PNEUMOCOCCAL VACCINE: 0-64 Years (1 of 1 - PPSV23) 07/05/1963   • IMM ZOSTER VACCINES (1 of 2) 07/05/2007   • PAP SMEAR  08/05/2016   • MAMMOGRAM  10/10/2020       - Patient plans to schedule appointment for Mammogram and Pap.    6.  AHA (Pulse[employer]) form printed for Provider? Email sent to [facility] requesting form

## 2021-02-10 ENCOUNTER — OFFICE VISIT (OUTPATIENT)
Dept: MEDICAL GROUP | Facility: PHYSICIAN GROUP | Age: 66
End: 2021-02-10
Payer: OTHER MISCELLANEOUS

## 2021-02-10 VITALS
OXYGEN SATURATION: 96 % | BODY MASS INDEX: 33.61 KG/M2 | DIASTOLIC BLOOD PRESSURE: 70 MMHG | HEART RATE: 71 BPM | HEIGHT: 68 IN | WEIGHT: 221.78 LBS | TEMPERATURE: 98.2 F | SYSTOLIC BLOOD PRESSURE: 124 MMHG

## 2021-02-10 DIAGNOSIS — E03.9 HYPOTHYROIDISM, UNSPECIFIED TYPE: ICD-10-CM

## 2021-02-10 DIAGNOSIS — E55.9 VITAMIN D DEFICIENCY: ICD-10-CM

## 2021-02-10 DIAGNOSIS — N95.1 SYMPTOMATIC MENOPAUSAL OR FEMALE CLIMACTERIC STATES: ICD-10-CM

## 2021-02-10 DIAGNOSIS — G90.2 HORNER'S SYNDROME: ICD-10-CM

## 2021-02-10 DIAGNOSIS — E78.5 DYSLIPIDEMIA: ICD-10-CM

## 2021-02-10 PROCEDURE — 99214 OFFICE O/P EST MOD 30 MIN: CPT | Performed by: FAMILY MEDICINE

## 2021-02-10 ASSESSMENT — PATIENT HEALTH QUESTIONNAIRE - PHQ9: CLINICAL INTERPRETATION OF PHQ2 SCORE: 0

## 2021-02-10 NOTE — PROGRESS NOTES
"Subjective:      Melissa Escamilla is a 65 y.o. female who presents with Follow-Up            HPI     Patient returns for follow-up of her medical problems.    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  She was sent for imaging studies specifically MRI of the soft tissues of the neck, orbits, face and brachial plexus which she did in the [facility].  She said all her MRIs came back no abnormal findings.  Dr. Bowens therefore could not find any etiology of the Jerardo syndrome.  Patient is asymptomatic.  She was relieved that they could not find any tumors or other problems on MRI.    She continues to take her simvastatin for dyslipidemia.  Blood work was done before this visit.    In terms of hypothyroidism, she continues to take thyroid replacement.    We follow her for vitamin D deficiency and she is taking 1000 international units daily.    She decrease the dose of sertraline 50 mg 1 tablet daily to half a tablet daily about a month ago.  She said she has gained weight from taking sertraline.  She wants to try getting herself off the medication.  We have given this to her for menopausal symptoms.  She has started to feel more anxious with the lower dose but she is still planning to completely be off the medication.    I treated her for eczema of the right eyelid last November alone cream and she said this worked very well.  Uses the cream only as needed she has a breakout.    Past medical history, past surgical history, family history reviewed-no changes    Social history reviewed-no changes    Allergies reviewed-no changes    Medications reviewed-no changes    ROS     As per HPI, the rest are negative.     Objective:     /70 (BP Location: Left arm, Patient Position: Sitting)   Pulse 71   Temp 36.8 °C (98.2 °F) (Temporal)   Ht 1.727 m (5' 8\")   Wt 101 kg (221 lb 12.5 oz)   SpO2 96%   BMI 33.72 kg/m²      Physical Exam     Examined alert, awake, oriented, not in " distress    Eyes-right pupil smaller than the left but positive PERRLA, EOMs intact  Neck-supple, no lymphadenopathy, no thyromegaly  Lungs-clear to auscultation, no rales, no wheezes  Heart-regular rate and rhythm, no murmur  Extremities-no edema, clubbing, cyanosis       Labs done at [facility] on 2/3/2020    CMP within normal limits, fasting blood sugar within normal limits    Lipid panel within normal limits    TSH normal    Hemoglobin A1c 5.3.    Vitamin D 23, normal range      Assessment/Plan:        1. Jerardo's syndrome  She had MRI done of her orbits, face, soft tissues of the neck [facility] to work her up for the Jerardo syndrome with no abnormal findings and she is happy with the results.  She is asymptomatic.    2. Dyslipidemia  All at target on simvastatin.  Continue medication.  - Lipid Profile; Future  - VITAMIN D,25 HYDROXY; Future  - CBC WITH DIFFERENTIAL; Future  - TSH; Future  - Comp Metabolic Panel; Future    3. Hypothyroidism, unspecified type  Adequately replaced.  Continue the same dose of levothyroxine.  - Lipid Profile; Future  - VITAMIN D,25 HYDROXY; Future  - CBC WITH DIFFERENTIAL; Future  - TSH; Future  - Comp Metabolic Panel; Future    4. Vitamin D deficiency  Vitamin D is adequately replaced.  Increase vitamin D to 2000 international units daily.  We will recheck vitamin D level next visit  - Lipid Profile; Future  - VITAMIN D,25 HYDROXY; Future  - CBC WITH DIFFERENTIAL; Future  - TSH; Future  - Comp Metabolic Panel; Future    5. Symptomatic menopausal or female climacteric states  She can decrease the sertraline 25 mg half a tablet daily to every other day for 1 month and she can discontinue after that if she is doing well.    She is due for GYN exam/Pap smear.  She will schedule a visit with her gynecologist Dr. Carvajal.  She is also due for mammogram and she will schedule one at [facility].    Please note that this dictation was created using voice  recognition software. I have worked with consultants from the vendor as well as technical experts from [facility] to optimize the interface. I have made every reasonable attempt to correct obvious errors, but I expect that there are errors of grammar and possibly content I did not discover before finalizing the note.

## 2021-03-12 DIAGNOSIS — N95.1 SYMPTOMATIC MENOPAUSAL OR FEMALE CLIMACTERIC STATES: ICD-10-CM

## 2021-03-15 DIAGNOSIS — Z23 NEED FOR VACCINATION: ICD-10-CM

## 2021-03-23 ENCOUNTER — IMMUNIZATION (OUTPATIENT)
Dept: FAMILY PLANNING/WOMEN'S HEALTH CLINIC | Facility: IMMUNIZATION CENTER | Age: 66
End: 2021-03-23
Attending: INTERNAL MEDICINE
Payer: OTHER MISCELLANEOUS

## 2021-03-23 DIAGNOSIS — Z23 ENCOUNTER FOR VACCINATION: Primary | ICD-10-CM

## 2021-03-23 DIAGNOSIS — Z23 NEED FOR VACCINATION: ICD-10-CM

## 2021-03-23 PROCEDURE — 91300 PFIZER SARS-COV-2 VACCINE: CPT

## 2021-03-23 PROCEDURE — 0001A PFIZER SARS-COV-2 VACCINE: CPT

## 2021-04-16 ENCOUNTER — IMMUNIZATION (OUTPATIENT)
Dept: FAMILY PLANNING/WOMEN'S HEALTH CLINIC | Facility: IMMUNIZATION CENTER | Age: 66
End: 2021-04-16
Attending: INTERNAL MEDICINE
Payer: OTHER MISCELLANEOUS

## 2021-04-16 DIAGNOSIS — Z23 ENCOUNTER FOR VACCINATION: Primary | ICD-10-CM

## 2021-04-16 PROCEDURE — 91300 PFIZER SARS-COV-2 VACCINE: CPT

## 2021-04-16 PROCEDURE — 0002A PFIZER SARS-COV-2 VACCINE: CPT

## 2021-05-11 ENCOUNTER — HOSPITAL ENCOUNTER (OUTPATIENT)
Dept: RADIOLOGY | Facility: MEDICAL CENTER | Age: 66
End: 2021-05-11
Attending: NURSE PRACTITIONER
Payer: OTHER MISCELLANEOUS

## 2021-05-11 ENCOUNTER — OFFICE VISIT (OUTPATIENT)
Dept: URGENT CARE | Facility: PHYSICIAN GROUP | Age: 66
End: 2021-05-11
Payer: OTHER MISCELLANEOUS

## 2021-05-11 VITALS
SYSTOLIC BLOOD PRESSURE: 122 MMHG | HEIGHT: 67 IN | BODY MASS INDEX: 34 KG/M2 | DIASTOLIC BLOOD PRESSURE: 72 MMHG | RESPIRATION RATE: 16 BRPM | TEMPERATURE: 98.9 F | WEIGHT: 216.6 LBS | HEART RATE: 79 BPM | OXYGEN SATURATION: 97 %
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
## 2021-05-11 DIAGNOSIS — R11.0 NAUSEA: ICD-10-CM

## 2021-05-11 DIAGNOSIS — S09.93XA FACIAL INJURY, INITIAL ENCOUNTER: ICD-10-CM

## 2021-05-11 DIAGNOSIS — S16.1XXA STRAIN OF NECK MUSCLE, INITIAL ENCOUNTER: ICD-10-CM

## 2021-05-11 DIAGNOSIS — S09.90XA CLOSED HEAD INJURY, INITIAL ENCOUNTER: ICD-10-CM

## 2021-05-11 DIAGNOSIS — M54.2 NECK PAIN: ICD-10-CM

## 2021-05-11 DIAGNOSIS — S00.81XA ABRASION OF FACE, INITIAL ENCOUNTER: ICD-10-CM

## 2021-05-11 PROCEDURE — 99214 OFFICE O/P EST MOD 30 MIN: CPT | Performed by: NURSE PRACTITIONER

## 2021-05-11 PROCEDURE — 72125 CT NECK SPINE W/O DYE: CPT

## 2021-05-11 PROCEDURE — 70450 CT HEAD/BRAIN W/O DYE: CPT

## 2021-05-11 PROCEDURE — 70486 CT MAXILLOFACIAL W/O DYE: CPT

## 2021-05-11 RX ORDER — ONDANSETRON 4 MG/1
4 TABLET, ORALLY DISINTEGRATING ORAL EVERY 8 HOURS PRN
Qty: 10 TABLET | Refills: 0 | Status: SHIPPED
Start: 2021-05-11 | End: 2021-05-11

## 2021-05-11 RX ORDER — IBUPROFEN 800 MG/1
800 TABLET ORAL EVERY 8 HOURS PRN
Qty: 30 TABLET | Refills: 0 | Status: SHIPPED | OUTPATIENT
Start: 2021-05-11 | End: 2023-02-28

## 2021-05-11 RX ORDER — CEPHALEXIN 500 MG/1
500 CAPSULE ORAL 4 TIMES DAILY
Qty: 20 CAPSULE | Refills: 0 | Status: SHIPPED | OUTPATIENT
Start: 2021-05-11 | End: 2021-05-16

## 2021-05-11 RX ORDER — CYCLOBENZAPRINE HCL 5 MG
5-10 TABLET ORAL EVERY 8 HOURS PRN
Qty: 30 TABLET | Refills: 0 | Status: SHIPPED | OUTPATIENT
Start: 2021-05-11 | End: 2023-02-28

## 2021-05-11 RX ORDER — ONDANSETRON 4 MG/1
4 TABLET, ORALLY DISINTEGRATING ORAL ONCE
Status: COMPLETED | OUTPATIENT
Start: 2021-05-11 | End: 2021-05-11

## 2021-05-11 RX ADMIN — ONDANSETRON 4 MG: 4 TABLET, ORALLY DISINTEGRATING ORAL at 16:45

## 2021-05-11 ASSESSMENT — ENCOUNTER SYMPTOMS
NECK PAIN: 1
SENSORY CHANGE: 0
RESPIRATORY NEGATIVE: 1
DOUBLE VISION: 0
BLURRED VISION: 0
CARDIOVASCULAR NEGATIVE: 1
DIZZINESS: 1
WEAKNESS: 0
VOMITING: 0
LOSS OF CONSCIOUSNESS: 0
NAUSEA: 1
HEADACHES: 0
CONSTITUTIONAL NEGATIVE: 1
EYES NEGATIVE: 1

## 2021-05-11 ASSESSMENT — VISUAL ACUITY: OU: 1

## 2021-05-11 NOTE — PATIENT INSTRUCTIONS
Head Injury, Adult  There are many types of head injuries. Head injuries can be as minor as a bump, or they can be a serious medical issue. More severe head injuries include:  · A jarring injury to the brain (concussion).  · A bruise (contusion) of the brain. This means there is bleeding in the brain that can cause swelling.  · A cracked skull (skull fracture).  · Bleeding in the brain that collects, clots, and forms a bump (hematoma).  After a head injury, most problems occur within the first 24 hours, but side effects may occur up to 7-10 days after the injury. It is important to watch your condition for any changes. You may need to be observed in the emergency department or urgent care, or you may be admitted to the hospital.  What are the causes?  There are many possible causes of a head injury. A serious head injury may be caused by a car accident, bicycle or motorcycle accidents, sports injuries, and falls.  What are the symptoms?  Symptoms of a head injury include a contusion, bump, or bleeding at the site of the injury. Other physical symptoms may include:  · Headache.  · Nausea or vomiting.  · Dizziness.  · Feeling tired.  · Being uncomfortable around bright lights or loud noises.  · Seizures.  · Trouble being awakened.  · Fainting.  Mental or emotional symptoms may include:  · Irritability.  · Confusion and memory problems.  · Poor attention and concentration.  · Changes in eating or sleeping habits.  · Anxiety or depression.  How is this diagnosed?  This condition can usually be diagnosed based on your symptoms, a description of the injury, and a physical exam. You may also have imaging tests done, such as a CT scan or MRI.  How is this treated?  Treatment for this condition depends on the severity and type of injury you have. The main goal of treatment is to prevent complications and to allow the brain time to heal.  Mild head injury  If you have a mild head injury, you may be sent home and treatment may  include:  · Observation. A responsible adult should stay with you for 24 hours after your injury and check on you often.  · Physical rest.  · Brain rest.  · Pain medicines.  Severe head injury  If you have a severe head injury, treatment may include:  · Close observation. This includes hospitalization with frequent physical exams.  · Medicines to relieve pain, prevent seizures, and decrease brain swelling.  · Breathing support. This may include using a ventilator.  · Treatments to manage the swelling inside the brain.  · Brain surgery. This may be needed to:  ? Remove a blood clot.  ? Stop the bleeding.  ? Remove a part of the skull to allow room for the brain to swell.  Follow these instructions at home:  Activity  · Rest and avoid activities that are physically hard or tiring.  · Make sure you get enough sleep.  · Limit activities that require a lot of thought or attention, such as:  ? Watching TV.  ? Playing memory games and puzzles.  ? Job-related work or homework.  ? Working on the computer, using social media, and texting.  · Avoid activities that could cause another head injury, such as playing sports, until your health care provider approves. Having another head injury, especially before the first one has healed, can be dangerous.  · Ask your health care provider when it is safe for you to return to your regular activities, including work or school. Ask your health care provider for a step-by-step plan for gradually returning to activities.  · Ask your health care provider when you can drive, ride a bicycle, or use heavy machinery. Your ability to react may be slower after a brain injury. Do not do these activities if you are dizzy.  Lifestyle    · Do not drink alcohol until your health care provider approves. Do not use drugs. Alcohol and certain drugs may slow your recovery and can put you at risk of further injury.  · If it is harder than usual to remember things, write them down.  · If you are easily  distracted, try to do one thing at a time.  · Talk with family members or close friends when making important decisions.  · Tell your friends, family, a trusted colleague, and  about your injury, symptoms, and restrictions. Have them watch for any new or worsening problems.  General instructions  · Take over-the-counter and prescription medicines only as told by your health care provider.  · Have someone stay with you for 24 hours after your head injury. This person should watch you for any changes in your symptoms and be ready to seek medical help.  · Keep all follow-up visits as told by your health care provider. This is important.  How is this prevented?  · Work on improving your balance and strength to avoid falls.  · Wear a seatbelt when you are in a moving vehicle.  · Wear a helmet when riding a bicycle, skiing, or doing any other sport or activity that has a risk of injury.  · If you drink alcohol:  ? Limit how much you use to:  ? 0-1 drink a day for women.  ? 0-2 drinks a day for men.  ? Be aware of how much alcohol is in your drink. In the [place] one drink equals one 12 oz bottle of beer (355 mL), one 5 oz glass of wine (148 mL), or one 1½ oz glass of hard liquor (44 mL).  · Take safety measures in your home, such as:  ? Removing clutter and tripping hazards from floors and stairways.  ? Using grab bars in bathrooms and handrails by stairs.  ? Placing non-slip mats on floors and in bathtubs.  ? Improving lighting in dim areas.  Get help right away if:  · You have:  ? A severe headache that is not helped by medicine.  ? Trouble walking or weakness in your arms and legs.  ? Clear or bloody fluid coming from your nose or ears.  ? Changes in your vision.  ? A seizure.  · You lose your balance.  · You vomit.  · Your pupils change size.  · Your speech is slurred.  · Your dizziness gets worse.  · You faint.  · You are sleepier than normal and have trouble staying awake.  · Your symptoms get  worse.  These symptoms may represent a serious problem that is an emergency. Do not wait to see if the symptoms will go away. Get medical help right away. Call your local emergency services (911 in the [place] Do not drive yourself to the hospital.  Summary  · Head injuries can be minor or they can be a serious medical issue requiring immediate attention.  · Treatment for this condition depends on the severity and type of injury you have.  · Ask your health care provider when it is safe for you to return to your regular activities, including work or school.  · Head injury prevention includes wearing a seat belt in a motor vehicle, using a helmet on a bicycle, limiting alcohol use, and taking safety measures in your home.  This information is not intended to replace advice given to you by your health care provider. Make sure you discuss any questions you have with your health care provider.  Document Released: 12/18/2006 Document Revised: 01/15/2020 Document Reviewed: 01/10/2020  Elsevier Patient Education © 2020 [employer]        Cervical Sprain    A cervical sprain is a stretch or tear in one or more of the tough, cord-like tissues that connect bones (ligaments) in the neck. Cervical sprains can range from mild to severe. Severe cervical sprains can cause the spinal bones (vertebrae) in the neck to be unstable. This can lead to spinal cord damage and can result in serious nervous system problems.  The amount of time that it takes for a cervical sprain to get better depends on the cause and extent of the injury. Most cervical sprains heal in 4-6 weeks.  What are the causes?  Cervical sprains may be caused by an injury (trauma), such as from a motor vehicle accident, a fall, or sudden forward and backward whipping movement of the head and neck (whiplash injury).  Mild cervical sprains may be caused by wear and tear over time, such as from poor posture, sitting in a chair that does not provide support, or looking up  or down for long periods of time.  What increases the risk?  The following factors may make you more likely to develop this condition:  · Participating in activities that have a high risk of trauma to the neck. These include contact sports, auto racing, gymnastics, and diving.  · Taking risks when driving or riding in a motor vehicle, such as speeding.  · Having osteoarthritis of the spine.  · Having poor strength and flexibility of the neck.  · A previous neck injury.  · Having poor posture.  · Spending a lot of time in certain positions that put stress on the neck, such as sitting at a computer for long periods of time.  What are the signs or symptoms?  Symptoms of this condition include:  · Pain, soreness, stiffness, tenderness, swelling, or a burning sensation in the front, back, or sides of the neck.  · Sudden tightening of neck muscles that you cannot control (muscle spasms).  · Pain in the shoulders or upper back.  · Limited ability to move the neck.  · Headache.  · Dizziness.  · Nausea.  · Vomiting.  · Weakness, numbness, or tingling in a hand or an arm.  Symptoms may develop right away after injury, or they may develop over a few days. In some cases, symptoms may go away with treatment and return (recur) over time.  How is this diagnosed?  This condition may be diagnosed based on:  · Your medical history.  · Your symptoms.  · Any recent injuries or known neck problems that you have, such as arthritis in the neck.  · A physical exam.  · Imaging tests, such as:  ? X-rays.  ? MRI.  ? CT scan.  How is this treated?  This condition is treated by resting and icing the injured area and doing physical therapy exercises. Depending on the severity of your condition, treatment may also include:  · Keeping your neck in place (immobilized) for periods of time. This may be done using:  ? A cervical collar. This supports your chin and the back of your head.  ? A cervical traction device. This is a sling that holds up your  head. This removes weight and pressure from your neck, and it may help to relieve pain.  · Medicines that help to relieve pain and inflammation.  · Medicines that help to relax your muscles (muscle relaxants).  · Surgery. This is rare.  Follow these instructions at home:  If you have a cervical collar:    · Wear it as told by your health care provider. Do not remove the collar unless instructed by your health care provider.  · Ask your health care provider before you make any adjustments to your collar.  · If you have long hair, keep it outside of the collar.  · Ask your health care provider if you can remove the collar for cleaning and bathing. If you are allowed to remove the collar for cleaning or bathing:  ? Follow instructions from your health care provider about how to remove the collar safely.  ? Clean the collar by wiping it with mild soap and water and drying it completely.  ? If your collar has removable pads, remove them every 1-2 days and wash them by hand with soap and water. Let them air-dry completely before you put them back in the collar.  ? Check your skin under the collar for irritation or sores. If you see any, tell your health care provider.  Managing pain, stiffness, and swelling    · If directed, use a cervical traction device as told by your health care provider.  · If directed, apply heat to the affected area before you do your physical therapy or as often as told by your health care provider. Use the heat source that your health care provider recommends, such as a moist heat pack or a heating pad.  ? Place a towel between your skin and the heat source.  ? Leave the heat on for 20-30 minutes.  ? Remove the heat if your skin turns bright red. This is especially important if you are unable to feel pain, heat, or cold. You may have a greater risk of getting burned.  · If directed, put ice on the affected area:  ? Put ice in a plastic bag.  ? Place a towel between your skin and the bag.  ? Leave  the ice on for 20 minutes, 2-3 times a day.  Activity  · Do not drive while wearing a cervical collar. If you do not have a cervical collar, ask your health care provider if it is safe to drive while your neck heals.  · Do not drive or use heavy machinery while taking prescription pain medicine or muscle relaxants, unless your health care provider approves.  · Do not lift anything that is heavier than 10 lb (4.5 kg) until your health care provider tells you that it is safe.  · Rest as directed by your health care provider. Avoid positions and activities that make your symptoms worse. Ask your health care provider what activities are safe for you.  · If physical therapy was prescribed, do exercises as told by your health care provider or physical therapist.  General instructions  · Take over-the-counter and prescription medicines only as told by your health care provider.  · Do not use any products that contain nicotine or tobacco, such as cigarettes and e-cigarettes. These can delay healing. If you need help quitting, ask your health care provider.  · Keep all follow-up visits as told by your health care provider or physical therapist. This is important.  How is this prevented?  To prevent a cervical sprain from happening again:  · Use and maintain good posture. Make any needed adjustments to your workstation to help you use good posture.  · Exercise regularly as directed by your health care provider or physical therapist.  · Avoid risky activities that may cause a cervical sprain.  Contact a health care provider if:  · You have symptoms that get worse or do not get better after 2 weeks of treatment.  · You have pain that gets worse or does not get better with medicine.  · You develop new, unexplained symptoms.  · You have sores or irritated skin on your neck from wearing your cervical collar.  Get help right away if:  · You have severe pain.  · You develop numbness, tingling, or weakness in any part of your  body.  · You cannot move a part of your body (you have paralysis).  · You have neck pain along with:  ? Severe dizziness.  ? Headache.  Summary  · A cervical sprain is a stretch or tear in one or more of the tough, cord-like tissues that connect bones (ligaments) in the neck.  · Cervical sprains may be caused by an injury (trauma), such as from a motor vehicle accident, a fall, or sudden forward and backward whipping movement of the head and neck (whiplash injury).  · Symptoms may develop right away after injury, or they may develop over a few days.  · This condition is treated by resting and icing the injured area and doing physical therapy exercises.  This information is not intended to replace advice given to you by your health care provider. Make sure you discuss any questions you have with your health care provider.  Document Released: 10/14/2008 Document Revised: 04/08/2020 Document Reviewed: 08/16/2017  Elsevier Patient Education © 2020 [employer]          Abrasion    An abrasion is a cut or a scrape on the outer surface of the skin. An abrasion does not go through all the layers of the skin. It is important to care for your abrasion properly to prevent infection.  What are the causes?  This condition is caused by falling on or gliding across the ground or another surface. When your skin rubs on something, the outer and inner layers of skin may rub off.  What are the signs or symptoms?  The main symptom of this condition is a cut or a scrape. The scrape may be bleeding, or it may appear red or pink. If the abrasion was caused by a fall, there may be a bruise under the cut or scrape.  How is this diagnosed?  An abrasion is diagnosed with a physical exam.  How is this treated?  Treatment for this condition depends on how large and deep the abrasion is. In most cases:  · Your abrasion will be cleaned with water and mild soap. This is done to remove any dirt or debris (such as particles of glass or rock) that  may be stuck in the wound.  · An antibiotic ointment may be applied to the abrasion to help prevent infection.  · A bandage (dressing) may be placed on the abrasion to keep it clean.  You may also need a tetanus shot.  Follow these instructions at home:  Medicines  · Take or apply over-the-counter and prescription medicines only as told by your health care provider.  · If you were prescribed an antibiotic medicine, apply it as told by your health care provider.  Wound care  · Clean the wound 2-3 times a day, or as directed by your health care provider. To do this, wash the wound with mild soap and water, rinse off the soap, and pat the wound dry with a clean towel. Do not rub the wound.  · Keep the dressing clean and dry as told by your health care provider.  · There are many different ways to close and cover a wound. Follow instructions from your health care provider about:  ? Caring for your wound.  ? Changing and removing your dressing. You may have to change your dressing one or more times a day, or as directed by your health care provider.  · Check your wound every day for signs of infection. Check for:  ? Redness, particularly a red streak that spreads out from the wound.  ? Swelling or increased pain.  ? Warmth.  ? Fluid, pus, or a bad smell.  · If directed, put ice on the injured area to reduce pain and swelling:  ? Put ice in a plastic bag.  ? Place a towel between your skin and the bag.  ? Leave the ice on for 20 minutes, 2-3 times a day.  General instructions  · Do not take baths, swim, or use a hot tub until your health care provider says it is okay to do so.  · If possible, raise (elevate) the injured area above the level of your heart while you are sitting or lying down. This will reduce pain and swelling.  · Keep all follow-up visits as directed by your health care provider. This is important.  Contact a health care provider if:  · You received a tetanus shot, and you have swelling, severe pain,  redness, or bleeding at the injection site.  · Your pain is not controlled with medicine.  · You have redness, swelling, or more pain at the site of your wound.  Get help right away if:  · You have a red streak spreading away from your wound.  · You have a fever.  · You have fluid, blood, or pus coming from your wound.  · You notice a bad smell coming from your wound or your dressing.  Summary  · An abrasion is a cut or a scrape on the outer surface of the skin. An abrasion does not go through all the layers of the skin.  · Care for your abrasion properly to prevent infection.  · Clean the wound with mild soap and water 2-3 times a day. Follow instructions from your health care provider about taking medicines and changing your bandage (dressing).  · Contact your health care provider if you have redness, swelling or more pain in the wound area.  · Get help right away if you have a fever or if you have fluid, blood, pus, a bad smell, or a red streak coming from the wound.  This information is not intended to replace advice given to you by your health care provider. Make sure you discuss any questions you have with your health care provider.  Document Released: 09/27/2006 Document Revised: 11/30/2018 Document Reviewed: 08/01/2018  [employer] Patient Education © 2020 [employer]      Start prescriptions as directed.    Cleanse abrasions at least once a day with mild soapy water.    Rest, ice, compression, and elevation (RICE) and over-the-counter acetaminophen, per 's instructions, as needed for pain.

## 2021-05-11 NOTE — PROGRESS NOTES
Subjective:     Melissa Escamilla is a 65 y.o. female who presents for Fall Less than 10 Feet (trip and fall while hiking, neck pain, face abrasions)       Head Injury   The injury mechanism was a fall. The pain is moderate. Pertinent negatives include no blurred vision, headaches, vomiting or weakness.     Today patient was hiking and almost back to her car when she tripped and fell onto dirt.  Was not able to catch her fall with her hands and fell directly onto her face.  Was wearing sunglasses at the time which may have helped take some of the impact.  However, has abrasions around her left eye and forehead.  Washed her face with soap and water when she got home, but a lot of dirt remains.    Initially was nauseous and dazed.  This improved.  Denies headache.  However, reports bilateral and posterior neck pain and tenderness.  Neck feels stiff.  Took ibuprofen prior to arrival.    Denies LOC, sensory changes, weakness, vomiting, nosebleed, ear discharge, or visual changes.    Tdap received 4/22/2014 per chart review.    Patient was screened prior to rooming and denied COVID-19 diagnosis or contact with a person who has been diagnosed or is suspected to have COVID-19. During this visit, appropriate PPE was worn, hand hygiene was performed, and the patient and any visitors were masked.     PMH:  has a past medical history of Dyslipidemia, Granuloma annulare, Hypothyroidism, and Symptomatic menopausal or female climacteric states. She also has no past medical history of Breast cancer (HCC).    MEDS:   Current Outpatient Medications:   •  ibuprofen (MOTRIN) 800 MG Tab, Take 1 tablet by mouth every 8 hours as needed for Moderate Pain, Fever or Inflammation., Disp: 30 tablet, Rfl: 0  •  cyclobenzaprine (FLEXERIL) 5 mg tablet, Take 1-2 Tablets by mouth every 8 hours as needed for Muscle Spasms., Disp: 30 tablet, Rfl: 0  •  cephALEXin (KEFLEX) 500 MG Cap, Take 1 capsule by mouth 4 times a day for 5 days., Disp: 20  "capsule, Rfl: 0  •  levothyroxine (SYNTHROID) 100 MCG Tab, TAKE 1 TAB BY MOUTH EVERY MORNING BEFORE BREAKFAST ON AN EMPTY STOMACH. , Disp: 30 tablet, Rfl: 5  •  sertraline (ZOLOFT) 50 MG Tab, TAKE ONE TABLET BY MOUTH EVERY DAY , Disp: 30 tablet, Rfl: 5  •  simvastatin (ZOCOR) 20 MG Tab, TAKE ONE TABLET BY MOUTH EVERY EVENING, Disp: 30 Tab, Rfl: 5  •  Cholecalciferol (VITAMIN D-3) 5000 UNIT TABS, Take  by mouth. 1 tab 2 x / week , Disp: , Rfl:     ALLERGIES: No Known Allergies    SURGHX:   Past Surgical History:   Procedure Laterality Date   • COLONOSCOPY  02/14/2018    small int hemorrhoids   • GASTROSCOPY-ENDO  10/10/12    normal esophagus, stomach and duodenum   • COLONOSCOPY WITH POLYP  10/10/12    colon polyp-adenomatous, hemorrhoids   • COLONOSCOPY  age 47    normal   • PRIMARY C SECTION       SOCHX:  reports that she has been smoking cigarettes. She has never used smokeless tobacco. She reports current alcohol use. She reports that she does not use drugs.     FH: Reviewed with patient, not pertinent to this visit.    Review of Systems   Constitutional: Negative.  Negative for malaise/fatigue.   HENT: Negative for ear discharge and nosebleeds.    Eyes: Negative.  Negative for blurred vision and double vision.   Respiratory: Negative.    Cardiovascular: Negative.    Gastrointestinal: Positive for nausea. Negative for vomiting.   Musculoskeletal: Positive for neck pain.   Skin: Positive for rash (Left facial abrasions).   Neurological: Positive for dizziness. Negative for sensory change, loss of consciousness, weakness and headaches.   All other systems reviewed and are negative.    Additional details per HPI.      Objective:     /72 (BP Location: Right arm, Patient Position: Sitting, BP Cuff Size: Adult)   Pulse 79   Temp 37.2 °C (98.9 °F) (Temporal)   Resp 16   Ht 1.702 m (5' 7\")   Wt 98.2 kg (216 lb 9.6 oz)   SpO2 97%   BMI 33.92 kg/m²     Physical Exam  Vitals reviewed.   Constitutional:       " General: She is not in acute distress.     Appearance: She is well-developed. She is not ill-appearing or toxic-appearing.   HENT:      Head: Normocephalic. Abrasion (Surrounding left eye, left forehead, left temple; TTP, no deformity, depression) present. No raccoon eyes or Omalley's sign.      Right Ear: External ear normal. No drainage.      Left Ear: External ear normal. No drainage.      Nose: Signs of injury (Abrasion) present. No nasal deformity, nasal tenderness or rhinorrhea.      Right Nostril: No epistaxis.      Left Nostril: No epistaxis.   Eyes:      General: Vision grossly intact.      Extraocular Movements: Extraocular movements intact.      Conjunctiva/sclera: Conjunctivae normal.   Cardiovascular:      Rate and Rhythm: Normal rate.   Pulmonary:      Effort: Pulmonary effort is normal. No respiratory distress.   Musculoskeletal:         General: No deformity.      Cervical back: Bony tenderness present. No swelling, deformity or lacerations. Muscular tenderness present. Decreased range of motion (Due to pain).   Skin:     General: Skin is warm and dry.      Coloration: Skin is not pale.   Neurological:      Mental Status: She is alert and oriented to person, place, and time.      GCS: GCS eye subscore is 4. GCS verbal subscore is 5. GCS motor subscore is 6.      Cranial Nerves: No dysarthria or facial asymmetry.      Sensory: No sensory deficit.      Motor: No weakness.      Coordination: Coordination normal.      Gait: Gait normal.   Psychiatric:         Behavior: Behavior normal. Behavior is cooperative.       CT C spine:    Details    Reading Physician Reading Date Result Priority   Ja Edwards M.D.  [phone] 5/11/2021 STAT      Narrative & Impression     5/11/2021 4:37 PM     HISTORY/REASON FOR EXAM: Pain following trauma     TECHNIQUE/EXAM DESCRIPTION:  CT cervical spine without contrast, with reconstructions.     The study was performed on a helical multidetector CT scanner.  Thin-section helical scanning was performed from the skull base through T1. Sagittal and coronal multiplanar reconstructions were generated from the axial images.     Low dose optimization technique was utilized for this CT exam including automated exposure control and adjustment of the mA and/or kV according to patient size.     COMPARISON:  None.     FINDINGS:  Alignment in the cervical spine is normal. There is no acute fracture or traumatic listhesis. The craniovertebral junction appears intact.     The prevertebral and paraspinous soft tissues are unremarkable.     Moderate spondylosis at C5-C7 levels, with disc height loss, endplate spurring and endplate sclerosis. Moderate lower cervical facet hypertrophy. Minimal grade 1 anterolisthesis at C4-C5 level, degenerative.     The superior mediastinum and lung apices in the field of view are unremarkable.     IMPRESSION:     No acute fracture or traumatic listhesis in the cervical spine.         Last Resulted: 05/11/21  5:15 PM        CT maxillofacial:    Details    Reading Physician Reading Date Result Priority   Celso Teran M.D.  [phone] 5/11/2021 STAT      Narrative & Impression     5/11/2021 4:37 PM     HISTORY/REASON FOR EXAM: Facial trauma; Facial injury.     TECHNIQUE/EXAM DESCRIPTION AND NUMBER OF VIEWS:  CT scan maxillofacial without contrast, with reconstructions.     Thin-section helical imaging was obtained of the face from the orbital roofs through the mandible. Coronal multiplanar volume reformat images were generated from the axial data.     Low dose optimization technique was utilized for this CT exam including automated exposure control and adjustment of the mA and/or kV according to patient size.     COMPARISON: None.     FINDINGS:  There is left periorbital soft tissue swelling.     No displaced fracture is detected.     The nasal bones are within normal limits in appearance.     No air-fluid level in the sinuses is seen. There is left  greater than right maxillary mucosal thickening     The mandible is normally positioned.     No orbital blowout fracture. The globes are symmetric and within normal limits in appearance. No post-septal abnormality.     IMPRESSION:     No displaced fracture of the facial bones.     Mild maxillary sinus inflammatory disease         Last Resulted: 05/11/21  5:44 PM        CT head:    Details    Reading Physician Reading Date Result Priority   Ja Edwards M.D.  [phone] 5/11/2021 STAT      Narrative & Impression   CT HEAD WITHOUT CONTRAST  5/11/2021 4:37 PM     HISTORY/REASON FOR EXAM:  Pain following trauma     TECHNIQUE/EXAM DESCRIPTION AND NUMBER OF VIEWS:  CT of the head without contrast.     The study was performed on a helical multidetector CT scanner. Contiguous 2.5 mm axial sections were obtained from the skull base through the vertex.     Up to date radiation dose reduction adjustments have been utilized to meet ALARA standards for radiation dose reduction.     COMPARISON:  None available     FINDINGS:  Lateral ventricles are normal in size and symmetric.  Cortical sulci are within normal limits.  No significant mass effect or midline shift.  Basal cisterns are patent.  No evidence for intracranial hemorrhage.  Calvaria are intact.     Visualized orbits are unremarkable.  Visualized mastoid air cells are clear.  No significant sinus disease in the visualized paranasal sinuses.     IMPRESSION:     No CT evidence of acute infarct, hemorrhage or mass.         Last Resulted: 05/11/21  5:07 PM          Assessment/Plan:     1. Fall, initial encounter  - CT-HEAD W/O; Future  - CT-CSPINE WITHOUT PLUS RECONS; Future  - CT-MAXILLOFACIAL W/O PLUS RECONS; Future    2. Closed head injury, initial encounter  - CT-HEAD W/O; Future    3. Neck pain  - CT-CSPINE WITHOUT PLUS RECONS; Future    4. Facial injury, initial encounter  - CT-MAXILLOFACIAL W/O PLUS RECONS; Future    5. Nausea  - CT-HEAD W/O; Future  -  ondansetron (ZOFRAN ODT) dispertab 4 mg    6. Abrasion of face, initial encounter  - cephALEXin (KEFLEX) 500 MG Cap; Take 1 capsule by mouth 4 times a day for 5 days.  Dispense: 20 capsule; Refill: 0    7. Strain of neck muscle, initial encounter  - ibuprofen (MOTRIN) 800 MG Tab; Take 1 tablet by mouth every 8 hours as needed for Moderate Pain, Fever or Inflammation.  Dispense: 30 tablet; Refill: 0  - cyclobenzaprine (FLEXERIL) 5 mg tablet; Take 1-2 Tablets by mouth every 8 hours as needed for Muscle Spasms.  Dispense: 30 tablet; Refill: 0    Considering mechanism of injury and physical exam findings, CT obtained to further evaluate symptoms. All unremarkable. No fracture, dislocation, or bleed.    Around 45 minutes was spent by the MA in cleaning the patient's facial abrasions. A large amount of dirt and debris was removed.    Prophylactic antibiotic sent to pharmacy. Tetanus UTD.    Patient advised to monitor for signs of infection including, but not limited to, increased redness, warmth, pain, swelling, discharge, or fever.     Rest, ice, compression, and elevation (RICE) and over-the-counter acetaminophen, per 's instructions, as needed for pain.    Warning signs reviewed. Strict return/ER precautions advised.     Differential diagnosis, natural history, supportive care, over-the-counter symptom management per 's instructions, close monitoring, and indications for immediate follow-up discussed.     Patient advised to: Return for 1) Symptoms that worsen/don't improve, or go to ER, 2) Follow up with primary care in 7-10 days.    All questions answered. Patient agrees with the plan of care.    Discharge summary provided.

## 2021-06-25 DIAGNOSIS — M25.361 UNSTABLE KNEE, RIGHT: ICD-10-CM

## 2021-06-25 DIAGNOSIS — M25.362 UNSTABLE KNEE, LEFT: ICD-10-CM

## 2021-06-26 DIAGNOSIS — E78.5 DYSLIPIDEMIA: ICD-10-CM

## 2021-06-27 RX ORDER — SIMVASTATIN 20 MG
TABLET ORAL
Qty: 30 TABLET | Refills: 5 | Status: SHIPPED | OUTPATIENT
Start: 2021-06-27 | End: 2022-02-28

## 2021-10-04 DIAGNOSIS — N95.1 SYMPTOMATIC MENOPAUSAL OR FEMALE CLIMACTERIC STATES: ICD-10-CM

## 2022-01-06 ENCOUNTER — PATIENT MESSAGE (OUTPATIENT)
Dept: HEALTH INFORMATION MANAGEMENT | Facility: OTHER | Age: 67
End: 2022-01-06
Payer: OTHER MISCELLANEOUS

## 2022-01-13 ENCOUNTER — OFFICE VISIT (OUTPATIENT)
Dept: MEDICAL GROUP | Facility: PHYSICIAN GROUP | Age: 67
End: 2022-01-13
Payer: OTHER MISCELLANEOUS

## 2022-01-13 ENCOUNTER — HOSPITAL ENCOUNTER (OUTPATIENT)
Facility: MEDICAL CENTER | Age: 67
End: 2022-01-13
Attending: FAMILY MEDICINE
Payer: OTHER MISCELLANEOUS

## 2022-01-13 VITALS
DIASTOLIC BLOOD PRESSURE: 80 MMHG | BODY MASS INDEX: 34.26 KG/M2 | TEMPERATURE: 97.6 F | HEART RATE: 65 BPM | SYSTOLIC BLOOD PRESSURE: 130 MMHG | HEIGHT: 67 IN | WEIGHT: 218.26 LBS | OXYGEN SATURATION: 96 %

## 2022-01-13 DIAGNOSIS — E03.9 HYPOTHYROIDISM, UNSPECIFIED TYPE: ICD-10-CM

## 2022-01-13 DIAGNOSIS — E78.5 DYSLIPIDEMIA: ICD-10-CM

## 2022-01-13 DIAGNOSIS — E55.9 VITAMIN D DEFICIENCY: ICD-10-CM

## 2022-01-13 DIAGNOSIS — Z20.822 SUSPECTED COVID-19 VIRUS INFECTION: ICD-10-CM

## 2022-01-13 DIAGNOSIS — R73.01 IMPAIRED FASTING BLOOD SUGAR: ICD-10-CM

## 2022-01-13 LAB
COVID ORDER STATUS COVID19: NORMAL
EXTERNAL QUALITY CONTROL: NORMAL
SARS-COV+SARS-COV-2 AG RESP QL IA.RAPID: NEGATIVE

## 2022-01-13 PROCEDURE — 99214 OFFICE O/P EST MOD 30 MIN: CPT | Mod: CS | Performed by: FAMILY MEDICINE

## 2022-01-13 PROCEDURE — U0005 INFEC AGEN DETEC AMPLI PROBE: HCPCS

## 2022-01-13 PROCEDURE — 87426 SARSCOV CORONAVIRUS AG IA: CPT | Performed by: FAMILY MEDICINE

## 2022-01-13 PROCEDURE — U0003 INFECTIOUS AGENT DETECTION BY NUCLEIC ACID (DNA OR RNA); SEVERE ACUTE RESPIRATORY SYNDROME CORONAVIRUS 2 (SARS-COV-2) (CORONAVIRUS DISEASE [COVID-19]), AMPLIFIED PROBE TECHNIQUE, MAKING USE OF HIGH THROUGHPUT TECHNOLOGIES AS DESCRIBED BY CMS-2020-01-R: HCPCS

## 2022-01-13 ASSESSMENT — PATIENT HEALTH QUESTIONNAIRE - PHQ9: CLINICAL INTERPRETATION OF PHQ2 SCORE: 0

## 2022-01-13 NOTE — PROGRESS NOTES
"Subjective     Melissa Escamilla is a 66 y.o. female who presents with Coronavirus Screening            HPI     Patient is here because of covid symptoms.  Symptoms started 3 days ago with scratchy throat and runny nose.  She said she also had sweating but no fever or chills.  Denies any cough, shortness of breath, loss of taste or smell, diarrhea.  She lives with her son who was recently tested positive with moderate symptoms.  This is a first COVID-19 test for the patient.  She has been vaccinated with 2 doses of Pfizer vaccine with the last dose in April 2021 but no booster shot yet.    She is also here for follow-up of her medical problems.    She continues to take simvastatin for dyslipidemia without myalgias.    We follow her for impaired fasting blood sugar and she manages this by watching her diet.    She continues to take thyroid replacement for hypothyroidism.    She continues to take vitamin D supplementation for vitamin D deficiency.    We tried to take her off completely sertraline for menopausal symptoms.  She said she did not do well off the medication so she started back taking it with good results.     Past medical history, past surgical history, family history reviewed-no changes    Social history reviewed-no changes    Allergies reviewed-no changes    Medications reviewed-no changes    ROS    As per HPI, the rest are negative         Objective     /80 (BP Location: Left arm, Patient Position: Sitting, BP Cuff Size: Adult)   Pulse 65   Temp 36.4 °C (97.6 °F) (Temporal)   Ht 1.702 m (5' 7\")   Wt 99 kg (218 lb 4.1 oz)   SpO2 96%   BMI 34.18 kg/m²      Physical Exam     Examined alert, awake, oriented, not in distress    Ears-tympanic membranes intact bilaterally without evidence of infection  Nose-no discharge, enlarged and erythematous turbinate right nostril but without obstruction  Throat-no erythema, tonsils not enlarged, no exudates  Neck-supple, no lymphadenopathy, no " thyromegaly  Lungs-clear to auscultation, no rales, no wheezes  Heart-regular rate and rhythm, no murmur  Extremities-no edema, clubbing, cyanosis             POCT COVID-19 test done in the office       Blood work done at [facility] lab on 12/2/2021    Total cholesterol 188, HDL 55, triglycerides 123,     Fasting blood glucose 97, the rest of the CMP within normal limits    TSH 2.58    CBC within normal limits    Vitamin D 37      Assessment & Plan        1. Suspected COVID-19 virus infection  POCT COVID-19 test done in the office came back negative.  We sent out a COVID-19 PCR to the lab.  Made her aware that it takes 1 to 2 days for the result to come back.  For now we will do conservative measures as her symptoms are mild.  Advised home isolation until we get the final result.  Advised to isolate from the son who is COVID-19 positive with symptoms.  Discussed proper measures such as wearing a mask, hand sanitizing and sanitizing common areas.  - POCT SARS-COV Antigen TIMMY (Symptomatic Only)  - SARS-CoV-2 PCR (24 hour In-House): Collect NP swab in VTM; Future     2. Dyslipidemia  .  Advised to get it down below 100.  Continue simvastatin.  Advised to work hard on watching the diet.    3.  Hypothyroidism, unspecified type  Adequately replaced.  Continue same dose of levothyroxine.    4.  Impaired fasting blood sugar  Fasting blood sugar normal, continue avoidance of sweets and decreasing the carbs.    5.  Vitamin D deficiency  Vitamin D adequately replaced.  Continue same dose of vitamin D supplementation.    Advised to make an appointment for regular follow-up in 6 months or sooner if needed.      Please note that this dictation was created using voice recognition software. I have worked with consultants from the vendor as well as technical experts from [employer] to optimize the interface. I have made every reasonable attempt to correct obvious errors, but I expect that there are errors of grammar and  possibly content I did not discover before finalizing the note.

## 2022-01-14 LAB
SARS-COV-2 RNA RESP QL NAA+PROBE: NOTDETECTED
SPECIMEN SOURCE: NORMAL

## 2022-01-22 DIAGNOSIS — N95.1 SYMPTOMATIC MENOPAUSAL OR FEMALE CLIMACTERIC STATES: ICD-10-CM

## 2022-01-23 RX ORDER — LEVOTHYROXINE SODIUM 0.1 MG/1
TABLET ORAL
Qty: 30 TABLET | Refills: 0 | Status: SHIPPED | OUTPATIENT
Start: 2022-01-23 | End: 2022-02-28

## 2022-07-21 DIAGNOSIS — E78.5 DYSLIPIDEMIA: ICD-10-CM

## 2022-07-21 RX ORDER — SIMVASTATIN 20 MG
20 TABLET ORAL EVERY EVENING
Qty: 100 TABLET | Refills: 0 | Status: SHIPPED | OUTPATIENT
Start: 2022-07-21 | End: 2022-08-30

## 2022-08-05 ENCOUNTER — HOSPITAL ENCOUNTER (OUTPATIENT)
Dept: LAB | Facility: MEDICAL CENTER | Age: 67
End: 2022-08-05
Attending: FAMILY MEDICINE
Payer: MEDICARE

## 2022-08-05 DIAGNOSIS — E78.5 DYSLIPIDEMIA: ICD-10-CM

## 2022-08-05 DIAGNOSIS — R73.01 IMPAIRED FASTING BLOOD SUGAR: ICD-10-CM

## 2022-08-05 DIAGNOSIS — E03.9 HYPOTHYROIDISM, UNSPECIFIED TYPE: ICD-10-CM

## 2022-08-05 LAB
ALBUMIN SERPL BCP-MCNC: 4.5 G/DL (ref 3.2–4.9)
ALBUMIN/GLOB SERPL: 1.7 G/DL
ALP SERPL-CCNC: 65 U/L (ref 30–99)
ALT SERPL-CCNC: 13 U/L (ref 2–50)
ANION GAP SERPL CALC-SCNC: 11 MMOL/L (ref 7–16)
AST SERPL-CCNC: 18 U/L (ref 12–45)
BILIRUB SERPL-MCNC: 0.3 MG/DL (ref 0.1–1.5)
BUN SERPL-MCNC: 21 MG/DL (ref 8–22)
CALCIUM SERPL-MCNC: 9.5 MG/DL (ref 8.5–10.5)
CHLORIDE SERPL-SCNC: 105 MMOL/L (ref 96–112)
CHOLEST SERPL-MCNC: 204 MG/DL (ref 100–199)
CO2 SERPL-SCNC: 24 MMOL/L (ref 20–33)
CREAT SERPL-MCNC: 0.83 MG/DL (ref 0.5–1.4)
EST. AVERAGE GLUCOSE BLD GHB EST-MCNC: 114 MG/DL
FASTING STATUS PATIENT QL REPORTED: NORMAL
GFR SERPLBLD CREATININE-BSD FMLA CKD-EPI: 78 ML/MIN/1.73 M 2
GLOBULIN SER CALC-MCNC: 2.6 G/DL (ref 1.9–3.5)
GLUCOSE SERPL-MCNC: 102 MG/DL (ref 65–99)
HBA1C MFR BLD: 5.6 % (ref 4–5.6)
HDLC SERPL-MCNC: 57 MG/DL
LDLC SERPL CALC-MCNC: 121 MG/DL
POTASSIUM SERPL-SCNC: 4.6 MMOL/L (ref 3.6–5.5)
PROT SERPL-MCNC: 7.1 G/DL (ref 6–8.2)
SODIUM SERPL-SCNC: 140 MMOL/L (ref 135–145)
TRIGL SERPL-MCNC: 129 MG/DL (ref 0–149)
TSH SERPL DL<=0.005 MIU/L-ACNC: 2.41 UIU/ML (ref 0.38–5.33)

## 2022-08-05 PROCEDURE — 84443 ASSAY THYROID STIM HORMONE: CPT

## 2022-08-05 PROCEDURE — 80061 LIPID PANEL: CPT

## 2022-08-05 PROCEDURE — 36415 COLL VENOUS BLD VENIPUNCTURE: CPT

## 2022-08-05 PROCEDURE — 83036 HEMOGLOBIN GLYCOSYLATED A1C: CPT

## 2022-08-05 PROCEDURE — 80053 COMPREHEN METABOLIC PANEL: CPT

## 2022-08-11 ENCOUNTER — OFFICE VISIT (OUTPATIENT)
Dept: MEDICAL GROUP | Facility: PHYSICIAN GROUP | Age: 67
End: 2022-08-11
Payer: MEDICARE

## 2022-08-11 VITALS
HEART RATE: 58 BPM | OXYGEN SATURATION: 96 % | DIASTOLIC BLOOD PRESSURE: 70 MMHG | HEIGHT: 67 IN | SYSTOLIC BLOOD PRESSURE: 110 MMHG | WEIGHT: 215.39 LBS | BODY MASS INDEX: 33.81 KG/M2 | TEMPERATURE: 97.4 F

## 2022-08-11 DIAGNOSIS — R73.01 IMPAIRED FASTING BLOOD SUGAR: ICD-10-CM

## 2022-08-11 DIAGNOSIS — R40.4 TRANSIENT ALTERATION OF AWARENESS: ICD-10-CM

## 2022-08-11 DIAGNOSIS — Z78.0 POSTMENOPAUSAL: ICD-10-CM

## 2022-08-11 DIAGNOSIS — E78.5 DYSLIPIDEMIA: ICD-10-CM

## 2022-08-11 DIAGNOSIS — E55.9 VITAMIN D DEFICIENCY: ICD-10-CM

## 2022-08-11 DIAGNOSIS — Z12.31 ENCOUNTER FOR SCREENING MAMMOGRAM FOR MALIGNANT NEOPLASM OF BREAST: ICD-10-CM

## 2022-08-11 DIAGNOSIS — Z23 NEED FOR VACCINATION FOR PNEUMOCOCCUS: ICD-10-CM

## 2022-08-11 DIAGNOSIS — E03.9 HYPOTHYROIDISM, UNSPECIFIED TYPE: ICD-10-CM

## 2022-08-11 DIAGNOSIS — Z11.59 NEED FOR HEPATITIS C SCREENING TEST: ICD-10-CM

## 2022-08-11 PROCEDURE — 90677 PCV20 VACCINE IM: CPT | Performed by: FAMILY MEDICINE

## 2022-08-11 PROCEDURE — 99214 OFFICE O/P EST MOD 30 MIN: CPT | Mod: 25 | Performed by: FAMILY MEDICINE

## 2022-08-11 PROCEDURE — 90471 IMMUNIZATION ADMIN: CPT | Performed by: FAMILY MEDICINE

## 2022-08-12 NOTE — PROGRESS NOTES
Subjective     Melissa Escamilla is a 67 y.o. female who presents with Follow-Up            HPI    Patient is here for follow-up of her medical problems.  She just got Medicare when she turns 67 last month.    2 days ago, she was shopping in a department store and when she came out to go to her car to the parking lot, she suddenly felt confused and she did not know where she was.  This lasted for about 5 seconds only.  She said it happened around 2 PM in the afternoon.  She said she had breakfast and lunch that day.  She said she was also hydrating herself.  She said she did not have any chest pain, shortness of breath, palpitations, sweating when the episode happened.  She did not feel that she was going to pass out.  She did not have any dizziness or lightheadedness.  She said that they she was feeling fatigued and exhausted when she got up that morning and felt that way the whole day.  That was the first time this episode happened to her.  She did not have any slurring of speech, weakness of any side of the body or extremity.  She said she was able to find her car and drive to the gas station to put gas in her car.  She admits she has been dealing with a lot of stress lately because of her elderly mother that she just transferred to a group home.  She said her father is not able to take care of her at home.    Patient continues to take simvastatin for dyslipidemia.  She admits she has not been taking this on a daily basis regularly.    She continues to take thyroid replacement for hypothyroidism.    We follow her for impaired fasting blood sugar and she manages this by watching her diet.    She continues to take vitamin D supplementation for vitamin D deficiency.    She is due for screening mammogram, DEXA scan, pneumonia shot.  She also qualifies for hepatitis C screening.    Past medical history, past surgical history, family history reviewed-no changes    Social history reviewed-no changes    Allergies  "reviewed-no changes    Medications reviewed-no changes        ROS    As per HPI, the rest are negative.           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, equally reactive, EOMs intact, no nystagmus, TMs intact, no nasal discharge, tongue midline, no facial droop, no tonsillopharyngeal injection  Neck-supple, no lymphadenopathy, no thyromegaly, no bruits  Lungs-clear to auscultation, no rales, no wheezes  Heart-regular rate and rhythm, no murmur  Extremities-no edema, clubbing, cyanosis  Neuro exam-cranial nerves intact, motor strength 5/5 upper and lower extremities, DTRs 2+, sensation intact to light touch, negative [facility] Outpatient Visit on 08/05/2022   Component Date Value Ref Range Status    TSH 08/05/2022 2.410  0.380 - 5.330 uIU/mL Final    Comment: Reference Range:    Pregnant Females, 1st Trimester  0.050-3.700  Pregnant Females, 2nd Trimester  0.310-4.350  Pregnant Females, 3rd Trimester  0.410-5.180      Glycohemoglobin 08/05/2022 5.6  4.0 - 5.6 % Final    Comment: Increased risk for diabetes:  5.7 -6.4%  Diabetes:  >6.4%  Glycemic control for adults with diabetes:  <7.0%    The above interpretations are per ADA guidelines.  Diagnosis  of diabetes mellitus on the basis of elevated Hemoglobin A1c  should be confirmed by repeating the Hb A1c test.      Est Avg Glucose 08/05/2022 114  mg/dL Final    Comment: The eAG calculation is based on the A1c-Derived Daily Glucose  (ADAG) study.  See the ADA's website for additional information.      Sodium 08/05/2022 140  135 - 145 mmol/L Final    Potassium 08/05/2022 4.6  3.6 - 5.5 mmol/L Final "    Chloride 08/05/2022 105  96 - 112 mmol/L Final    Co2 08/05/2022 24  20 - 33 mmol/L Final    Anion Gap 08/05/2022 11.0  7.0 - 16.0 Final    Glucose 08/05/2022 102 (A) 65 - 99 mg/dL Final    Bun 08/05/2022 21  8 - 22 mg/dL Final    Creatinine 08/05/2022 0.83  0.50 - 1.40 mg/dL Final    Calcium 08/05/2022 9.5  8.5 - 10.5 mg/dL Final    AST(SGOT) 08/05/2022 18  12 - 45 U/L Final    ALT(SGPT) 08/05/2022 13  2 - 50 U/L Final    Alkaline Phosphatase 08/05/2022 65  30 - 99 U/L Final    Total Bilirubin 08/05/2022 0.3  0.1 - 1.5 mg/dL Final    Albumin 08/05/2022 4.5  3.2 - 4.9 g/dL Final    Total Protein 08/05/2022 7.1  6.0 - 8.2 g/dL Final    Globulin 08/05/2022 2.6  1.9 - 3.5 g/dL Final    A-G Ratio 08/05/2022 1.7  g/dL Final    Cholesterol,Tot 08/05/2022 204 (A) 100 - 199 mg/dL Final    Triglycerides 08/05/2022 129  0 - 149 mg/dL Final    HDL 08/05/2022 57  >=40 mg/dL Final    LDL 08/05/2022 121 (A) <100 mg/dL Final    Fasting Status 08/05/2022 Fasting   Final    GFR (CKD-EPI) 08/05/2022 78  >60 mL/min/1.73 m 2 Final    Comment: Effective 3/15/2022, estimated Glomerular Filtration Rate  is calculated using race neutral CKD-EPI 2021 equation  per NKF-ASN recommendations.                  Assessment & Plan        1. Transient alteration of awareness  She had an episode of confusion where she did not know where she was.  This only lasted very briefly about 5 seconds.  No associated symptoms.  This could be from dehydration, hypoglycemia, stress related.  She is neurologically intact.  I told her if this recurs then she will need further evaluation and work-up.  If she has episode of presyncope or syncope then she will need evaluation at the ER.  Her blood work came back no abnormal findings.    2. Dyslipidemia  The LDL cholesterol is now high but she has not been taking her simvastatin regularly.  Advised to start to take it every night regularly.  Recheck blood work in a few months.  - TSH; Future  - Lipid Profile;  Future  - Comp Metabolic Panel; Future  - HEMOGLOBIN A1C; Future  - CBC WITH DIFFERENTIAL; Future  - HEP C VIRUS ANTIBODY; Future    3. Hypothyroidism, unspecified type  Adequately replaced.  Continue same dose of levothyroxine.  - TSH; Future  - Lipid Profile; Future  - Comp Metabolic Panel; Future  - HEMOGLOBIN A1C; Future  - CBC WITH DIFFERENTIAL; Future  - HEP C VIRUS ANTIBODY; Future    4. Impaired fasting blood sugar  Fasting blood sugar borderline elevated but the hemoglobin A1c is normal.  Continue avoidance of sweets and decreasing the carbs.  - TSH; Future  - Lipid Profile; Future  - Comp Metabolic Panel; Future  - HEMOGLOBIN A1C; Future  - CBC WITH DIFFERENTIAL; Future  - HEP C VIRUS ANTIBODY; Future    5. Vitamin D deficiency  Continue vitamin D supplementation.  - TSH; Future  - Lipid Profile; Future  - Comp Metabolic Panel; Future  - HEMOGLOBIN A1C; Future  - CBC WITH DIFFERENTIAL; Future  - HEP C VIRUS ANTIBODY; Future    6. Need for hepatitis C screening test  She qualifies for hepatitis C screening and this was ordered.    - TSH; Future  - Lipid Profile; Future  - Comp Metabolic Panel; Future  - HEMOGLOBIN A1C; Future  - CBC WITH DIFFERENTIAL; Future  - HEP C VIRUS ANTIBODY; Future    7. Encounter for screening mammogram for malignant neoplasm of breast  She will schedule her screening mammogram.  - MA-SCREENING MAMMO BILAT W/CAD; Future    8. Postmenopausal  She will also schedule bone density scan.  No prior bone density scan.   - DS-BONE DENSITY STUDY (DEXA); Future    9. Need for vaccination for pneumococcus  She just turned 67.  She needs Prevnar 20 and this was given.  - Pneumococcal Conjugate Vaccine 20-Valent (19 yrs+)    She still needs to have shingles vaccine which she will get at a later date at the local pharmacy.    She is aware I am leaving the practice to relocate to [place].  She will establish care with another PCP.      Please note that this dictation was created using voice  recognition software. I have worked with consultants from the vendor as well as technical experts from [facility] to optimize the interface. I have made every reasonable attempt to correct obvious errors, but I expect that there are errors of grammar and possibly content I did not discover before finalizing the note.

## 2022-08-26 ENCOUNTER — DOCUMENTATION (OUTPATIENT)
Dept: HEALTH INFORMATION MANAGEMENT | Facility: OTHER | Age: 67
End: 2022-08-26
Payer: MEDICARE

## 2022-09-08 ENCOUNTER — HOSPITAL ENCOUNTER (OUTPATIENT)
Dept: RADIOLOGY | Facility: MEDICAL CENTER | Age: 67
End: 2022-09-08
Payer: MEDICARE

## 2022-09-12 ENCOUNTER — HOSPITAL ENCOUNTER (OUTPATIENT)
Dept: RADIOLOGY | Facility: MEDICAL CENTER | Age: 67
End: 2022-09-12
Attending: FAMILY MEDICINE
Payer: MEDICARE

## 2022-09-12 DIAGNOSIS — Z78.0 POSTMENOPAUSAL: ICD-10-CM

## 2022-09-12 DIAGNOSIS — Z12.31 ENCOUNTER FOR SCREENING MAMMOGRAM FOR MALIGNANT NEOPLASM OF BREAST: ICD-10-CM

## 2022-09-12 PROCEDURE — 77080 DXA BONE DENSITY AXIAL: CPT

## 2022-09-12 PROCEDURE — 77063 BREAST TOMOSYNTHESIS BI: CPT

## 2022-09-21 ENCOUNTER — DOCUMENTATION (OUTPATIENT)
Dept: HEALTH INFORMATION MANAGEMENT | Facility: OTHER | Age: 67
End: 2022-09-21
Payer: MEDICARE

## 2022-09-29 DIAGNOSIS — N95.1 SYMPTOMATIC MENOPAUSAL OR FEMALE CLIMACTERIC STATES: ICD-10-CM

## 2022-09-29 DIAGNOSIS — E78.5 DYSLIPIDEMIA: ICD-10-CM

## 2022-09-29 RX ORDER — SIMVASTATIN 20 MG
20 TABLET ORAL EVERY EVENING
Qty: 90 TABLET | Refills: 0 | Status: SHIPPED | OUTPATIENT
Start: 2022-09-29 | End: 2023-02-28 | Stop reason: SDUPTHER

## 2022-09-29 RX ORDER — LEVOTHYROXINE SODIUM 0.1 MG/1
100 TABLET ORAL
Qty: 90 TABLET | Refills: 0 | Status: SHIPPED | OUTPATIENT
Start: 2022-09-29 | End: 2023-08-02 | Stop reason: SDUPTHER

## 2022-11-17 ENCOUNTER — DOCUMENTATION (OUTPATIENT)
Dept: HEALTH INFORMATION MANAGEMENT | Facility: OTHER | Age: 67
End: 2022-11-17
Payer: MEDICARE

## 2022-11-17 ENCOUNTER — PATIENT MESSAGE (OUTPATIENT)
Dept: HEALTH INFORMATION MANAGEMENT | Facility: OTHER | Age: 67
End: 2022-11-17

## 2023-02-10 DIAGNOSIS — N95.1 SYMPTOMATIC MENOPAUSAL OR FEMALE CLIMACTERIC STATES: ICD-10-CM

## 2023-02-10 NOTE — TELEPHONE ENCOUNTER
Received request via: Patient    Was the patient seen in the last year in this department? Yes    Does the patient have an active prescription (recently filled or refills available) for medication(s) requested? No    Does the patient have custodial Plus and need 100 day supply (blood pressure, diabetes and cholesterol meds only)? Patient does not have SCP      Patient has upcoming appt with you will be out of medication tomorrow

## 2023-02-24 ENCOUNTER — HOSPITAL ENCOUNTER (OUTPATIENT)
Dept: LAB | Facility: MEDICAL CENTER | Age: 68
End: 2023-02-24
Attending: FAMILY MEDICINE
Payer: MEDICARE

## 2023-02-24 DIAGNOSIS — R73.01 IMPAIRED FASTING BLOOD SUGAR: ICD-10-CM

## 2023-02-24 DIAGNOSIS — E55.9 VITAMIN D DEFICIENCY: ICD-10-CM

## 2023-02-24 DIAGNOSIS — Z11.59 NEED FOR HEPATITIS C SCREENING TEST: ICD-10-CM

## 2023-02-24 DIAGNOSIS — E03.9 HYPOTHYROIDISM, UNSPECIFIED TYPE: ICD-10-CM

## 2023-02-24 DIAGNOSIS — E78.5 DYSLIPIDEMIA: ICD-10-CM

## 2023-02-24 LAB
ALBUMIN SERPL BCP-MCNC: 4.4 G/DL (ref 3.2–4.9)
ALBUMIN/GLOB SERPL: 1.7 G/DL
ALP SERPL-CCNC: 60 U/L (ref 30–99)
ALT SERPL-CCNC: 14 U/L (ref 2–50)
ANION GAP SERPL CALC-SCNC: 10 MMOL/L (ref 7–16)
AST SERPL-CCNC: 18 U/L (ref 12–45)
BASOPHILS # BLD AUTO: 0.5 % (ref 0–1.8)
BASOPHILS # BLD: 0.02 K/UL (ref 0–0.12)
BILIRUB SERPL-MCNC: 0.2 MG/DL (ref 0.1–1.5)
BUN SERPL-MCNC: 18 MG/DL (ref 8–22)
CALCIUM ALBUM COR SERPL-MCNC: 8.9 MG/DL (ref 8.5–10.5)
CALCIUM SERPL-MCNC: 9.2 MG/DL (ref 8.5–10.5)
CHLORIDE SERPL-SCNC: 109 MMOL/L (ref 96–112)
CHOLEST SERPL-MCNC: 190 MG/DL (ref 100–199)
CO2 SERPL-SCNC: 23 MMOL/L (ref 20–33)
CREAT SERPL-MCNC: 0.72 MG/DL (ref 0.5–1.4)
EOSINOPHIL # BLD AUTO: 0.17 K/UL (ref 0–0.51)
EOSINOPHIL NFR BLD: 4.1 % (ref 0–6.9)
ERYTHROCYTE [DISTWIDTH] IN BLOOD BY AUTOMATED COUNT: 46.4 FL (ref 35.9–50)
EST. AVERAGE GLUCOSE BLD GHB EST-MCNC: 114 MG/DL
FASTING STATUS PATIENT QL REPORTED: NORMAL
GFR SERPLBLD CREATININE-BSD FMLA CKD-EPI: 92 ML/MIN/1.73 M 2
GLOBULIN SER CALC-MCNC: 2.6 G/DL (ref 1.9–3.5)
GLUCOSE SERPL-MCNC: 103 MG/DL (ref 65–99)
HBA1C MFR BLD: 5.6 % (ref 4–5.6)
HCT VFR BLD AUTO: 42 % (ref 37–47)
HCV AB SER QL: NORMAL
HDLC SERPL-MCNC: 57 MG/DL
HGB BLD-MCNC: 13.2 G/DL (ref 12–16)
IMM GRANULOCYTES # BLD AUTO: 0.01 K/UL (ref 0–0.11)
IMM GRANULOCYTES NFR BLD AUTO: 0.2 % (ref 0–0.9)
LDLC SERPL CALC-MCNC: 112 MG/DL
LYMPHOCYTES # BLD AUTO: 1.33 K/UL (ref 1–4.8)
LYMPHOCYTES NFR BLD: 32.3 % (ref 22–41)
MCH RBC QN AUTO: 29.7 PG (ref 27–33)
MCHC RBC AUTO-ENTMCNC: 31.4 G/DL (ref 33.6–35)
MCV RBC AUTO: 94.4 FL (ref 81.4–97.8)
MONOCYTES # BLD AUTO: 0.43 K/UL (ref 0–0.85)
MONOCYTES NFR BLD AUTO: 10.4 % (ref 0–13.4)
NEUTROPHILS # BLD AUTO: 2.16 K/UL (ref 2–7.15)
NEUTROPHILS NFR BLD: 52.5 % (ref 44–72)
NRBC # BLD AUTO: 0 K/UL
NRBC BLD-RTO: 0 /100 WBC
PLATELET # BLD AUTO: 254 K/UL (ref 164–446)
PMV BLD AUTO: 10.9 FL (ref 9–12.9)
POTASSIUM SERPL-SCNC: 4.4 MMOL/L (ref 3.6–5.5)
PROT SERPL-MCNC: 7 G/DL (ref 6–8.2)
RBC # BLD AUTO: 4.45 M/UL (ref 4.2–5.4)
SODIUM SERPL-SCNC: 142 MMOL/L (ref 135–145)
TRIGL SERPL-MCNC: 106 MG/DL (ref 0–149)
TSH SERPL DL<=0.005 MIU/L-ACNC: 0.99 UIU/ML (ref 0.38–5.33)
WBC # BLD AUTO: 4.1 K/UL (ref 4.8–10.8)

## 2023-02-24 PROCEDURE — 84443 ASSAY THYROID STIM HORMONE: CPT

## 2023-02-24 PROCEDURE — 83036 HEMOGLOBIN GLYCOSYLATED A1C: CPT

## 2023-02-24 PROCEDURE — 80053 COMPREHEN METABOLIC PANEL: CPT

## 2023-02-24 PROCEDURE — 86803 HEPATITIS C AB TEST: CPT

## 2023-02-24 PROCEDURE — 36415 COLL VENOUS BLD VENIPUNCTURE: CPT

## 2023-02-24 PROCEDURE — 85025 COMPLETE CBC W/AUTO DIFF WBC: CPT

## 2023-02-24 PROCEDURE — 80061 LIPID PANEL: CPT

## 2023-02-28 ENCOUNTER — OFFICE VISIT (OUTPATIENT)
Dept: MEDICAL GROUP | Facility: PHYSICIAN GROUP | Age: 68
End: 2023-02-28
Payer: MEDICARE

## 2023-02-28 VITALS
SYSTOLIC BLOOD PRESSURE: 120 MMHG | TEMPERATURE: 97.9 F | HEART RATE: 73 BPM | OXYGEN SATURATION: 94 % | WEIGHT: 218.4 LBS | DIASTOLIC BLOOD PRESSURE: 70 MMHG | HEIGHT: 67 IN | BODY MASS INDEX: 34.28 KG/M2

## 2023-02-28 DIAGNOSIS — D70.8 OTHER NEUTROPENIA (HCC): ICD-10-CM

## 2023-02-28 DIAGNOSIS — N95.1 SYMPTOMATIC MENOPAUSAL OR FEMALE CLIMACTERIC STATES: ICD-10-CM

## 2023-02-28 DIAGNOSIS — E66.9 OBESITY (BMI 35.0-39.9 WITHOUT COMORBIDITY): ICD-10-CM

## 2023-02-28 DIAGNOSIS — R73.01 IFG (IMPAIRED FASTING GLUCOSE): ICD-10-CM

## 2023-02-28 DIAGNOSIS — E78.5 DYSLIPIDEMIA: ICD-10-CM

## 2023-02-28 DIAGNOSIS — E66.9 OBESITY (BMI 30-39.9): ICD-10-CM

## 2023-02-28 DIAGNOSIS — E89.0 POSTABLATIVE HYPOTHYROIDISM: ICD-10-CM

## 2023-02-28 PROCEDURE — 99214 OFFICE O/P EST MOD 30 MIN: CPT

## 2023-02-28 RX ORDER — SIMVASTATIN 20 MG
20 TABLET ORAL EVERY EVENING
Qty: 100 TABLET | Refills: 1 | Status: SHIPPED | OUTPATIENT
Start: 2023-02-28 | End: 2024-02-27

## 2023-02-28 ASSESSMENT — ENCOUNTER SYMPTOMS
BLURRED VISION: 0
WEIGHT LOSS: 0
FEVER: 0
DIZZINESS: 0
MYALGIAS: 0
HEADACHES: 0
SHORTNESS OF BREATH: 0
CONSTIPATION: 0
DIARRHEA: 0
NAUSEA: 0
ABDOMINAL PAIN: 0
CHILLS: 0
WEAKNESS: 0
VOMITING: 0
COUGH: 0

## 2023-02-28 ASSESSMENT — FIBROSIS 4 INDEX: FIB4 SCORE: 1.23

## 2023-02-28 ASSESSMENT — PATIENT HEALTH QUESTIONNAIRE - PHQ9: CLINICAL INTERPRETATION OF PHQ2 SCORE: 0

## 2023-02-28 NOTE — PROGRESS NOTES
Subjective:     CC:  Diagnoses of Postablative hypothyroidism, Obesity (BMI 35.0-39.9 without comorbidity), Other neutropenia (HCC), Dyslipidemia, IFG (impaired fasting glucose), Symptomatic menopausal or female climacteric states, and Obesity (BMI 30-39.9) were pertinent to this visit.    HISTORY OF THE PRESENT ILLNESS: Patient is a 67 y.o. female. This pleasant patient is here today to establish care and discuss the following problems:    Problem   Ifg (Impaired Fasting Glucose)    Last glucose elevated at 103.  No recent A1C     Other neutropenia (HCC)    Chronic condition as far back as 2013.  Patient denies recent illness.  Levels have been stable.  She reports at 1 time she did see hematologist.     Obesity (Bmi 30-39.9)    BMI elevated at 34.21 with weight at 218 pounds.  Patient reports to eat somewhat healthy enjoys walking but no regular exercise routine.     Hypothyroidism    Chronic condition for which patient has had chemical ablation secondary to Graves' disease. She has been stable on levothyroxine 100 mcg each day.     Dyslipidemia    Chronic condition for which patient takes simvastatin 20 mg daily.  Lab Results   Component Value Date/Time    CHOLSTRLTOT 190 02/24/2023 07:07 AM    CHOLSTRLTOT 204 (H) 08/05/2022 09:24 AM     (H) 02/24/2023 07:07 AM     (H) 08/05/2022 09:24 AM    HDL 57 02/24/2023 07:07 AM    HDL 57 08/05/2022 09:24 AM    TRIGLYCERIDE 106 02/24/2023 07:07 AM    TRIGLYCERIDE 129 08/05/2022 09:24 AM            Symptomatic Menopausal Or Female Climacteric States    Chronic condition for which patient is taking Zoloft 50 mg daily.  She has been on this medication for several years and doing well.  She has tried to wean herself off at times but found it very difficult.  She denies suicidal ideations and feels well at this time and will benefit from staying on this medication.         Health Maintenance: Completed recommend shingles vaccine, COVID-vaccine, influenza vaccine,  "annual wellness visit, and cervical cancer screening    ROS:   Review of Systems   Constitutional:  Negative for chills, fever, malaise/fatigue and weight loss.   Eyes:  Negative for blurred vision.   Respiratory:  Negative for cough and shortness of breath.    Cardiovascular:  Negative for chest pain.   Gastrointestinal:  Negative for abdominal pain, constipation, diarrhea, nausea and vomiting.   Musculoskeletal:  Negative for myalgias.   Neurological:  Negative for dizziness, weakness and headaches.       Objective:     Exam: /70 (BP Location: Left arm, Patient Position: Sitting, BP Cuff Size: Adult)   Pulse 73   Temp 36.6 °C (97.9 °F) (Temporal)   Ht 1.702 m (5' 7\")   Wt 99.1 kg (218 lb 6.4 oz)   SpO2 94%  Body mass index is 34.21 kg/m².    Physical Exam  Constitutional:       General: She is not in acute distress.     Appearance: Normal appearance. She is not ill-appearing or toxic-appearing.   HENT:      Head: Normocephalic.   Eyes:      Conjunctiva/sclera: Conjunctivae normal.   Cardiovascular:      Rate and Rhythm: Normal rate and regular rhythm.      Pulses: Normal pulses.      Heart sounds: Normal heart sounds. No murmur heard.  Pulmonary:      Effort: Pulmonary effort is normal. No respiratory distress.      Breath sounds: Normal breath sounds.   Skin:     General: Skin is warm and dry.   Neurological:      General: No focal deficit present.      Mental Status: She is alert and oriented to person, place, and time.   Psychiatric:         Mood and Affect: Mood normal.         Behavior: Behavior normal.         Labs:   Hospital Outpatient Visit on 02/24/2023   Component Date Value    Hepatitis C Antibody 02/24/2023 Non-Reactive     WBC 02/24/2023 4.1 (L)     RBC 02/24/2023 4.45     Hemoglobin 02/24/2023 13.2     Hematocrit 02/24/2023 42.0     MCV 02/24/2023 94.4     MCH 02/24/2023 29.7     MCHC 02/24/2023 31.4 (L)     RDW 02/24/2023 46.4     Platelet Count 02/24/2023 254     MPV 02/24/2023 10.9     " Neutrophils-Polys 02/24/2023 52.50     Lymphocytes 02/24/2023 32.30     Monocytes 02/24/2023 10.40     Eosinophils 02/24/2023 4.10     Basophils 02/24/2023 0.50     Immature Granulocytes 02/24/2023 0.20     Nucleated RBC 02/24/2023 0.00     Neutrophils (Absolute) 02/24/2023 2.16     Lymphs (Absolute) 02/24/2023 1.33     Monos (Absolute) 02/24/2023 0.43     Eos (Absolute) 02/24/2023 0.17     Baso (Absolute) 02/24/2023 0.02     Immature Granulocytes (a* 02/24/2023 0.01     NRBC (Absolute) 02/24/2023 0.00     Glycohemoglobin 02/24/2023 5.6     Est Avg Glucose 02/24/2023 114     Sodium 02/24/2023 142     Potassium 02/24/2023 4.4     Chloride 02/24/2023 109     Co2 02/24/2023 23     Anion Gap 02/24/2023 10.0     Glucose 02/24/2023 103 (H)     Bun 02/24/2023 18     Creatinine 02/24/2023 0.72     Calcium 02/24/2023 9.2     AST(SGOT) 02/24/2023 18     ALT(SGPT) 02/24/2023 14     Alkaline Phosphatase 02/24/2023 60     Total Bilirubin 02/24/2023 0.2     Albumin 02/24/2023 4.4     Total Protein 02/24/2023 7.0     Globulin 02/24/2023 2.6     A-G Ratio 02/24/2023 1.7     Cholesterol,Tot 02/24/2023 190     Triglycerides 02/24/2023 106     HDL 02/24/2023 57     LDL 02/24/2023 112 (H)     TSH 02/24/2023 0.990     Fasting Status 02/24/2023 Fasting     Correct Calcium 02/24/2023 8.9     GFR (CKD-EPI) 02/24/2023 92          Assessment & Plan: Medical Decision Making   67 y.o. female with the following -    Problem List Items Addressed This Visit       Symptomatic menopausal or female climacteric states     Chronic condition stable therefore we will continue Zoloft at 50 mg daily and refill this medication for today.         Relevant Medications    sertraline (ZOLOFT) 50 MG Tab    Dyslipidemia     Chronic condition uncontrolled  -We will resume simvastatin at 20 mg daily but recommend to increase should LDL levels remain elevated  -Lipid panel will be repeated in 6 months         Relevant Medications    simvastatin (ZOCOR) 20 MG Tab     Other Relevant Orders    CBC WITH DIFFERENTIAL    Comp Metabolic Panel    Lipid Profile    TSH WITH REFLEX TO FT4    HEMOGLOBIN A1C    Obesity (BMI 30-39.9)     Chronic condition uncontrolled  - Recommend diligent efforts towards healthy diet and exercise as discussed.  Eating a Mediterranean style diet and getting at least 30 minutes of activity most days of the week         Other neutropenia (HCC)     Chronic condition stable therefore we will continue to monitor CBC with each lab draw.         Relevant Orders    CBC WITH DIFFERENTIAL    Comp Metabolic Panel    Lipid Profile    TSH WITH REFLEX TO FT4    HEMOGLOBIN A1C    Hypothyroidism     Chronic condition, biochemically stable therefore will continue with levothyroxine at 100 mcg daily         Relevant Orders    CBC WITH DIFFERENTIAL    Comp Metabolic Panel    Lipid Profile    TSH WITH REFLEX TO FT4    HEMOGLOBIN A1C    IFG (impaired fasting glucose)     Chronic, unknown progression.  Will check A1C         Relevant Orders    HEMOGLOBIN A1C     Other Visit Diagnoses       Obesity (BMI 35.0-39.9 without comorbidity)        Relevant Orders    CBC WITH DIFFERENTIAL    Comp Metabolic Panel    Lipid Profile    TSH WITH REFLEX TO FT4    HEMOGLOBIN A1C    Patient identified as having weight management issue.  Appropriate orders and counseling given.            Differential diagnosis, natural history, supportive care, and indications for immediate follow-up discussed.  Shared decision making approach was utilized, and patient is amendable with plan of care.  Patient understands to return to clinic or go to the emergency department if symptoms worsen. All questions and concerns addressed to the best of my knowledge.      Return in about 6 months (around 8/28/2023) for F/U Lab Review.    Please note that this dictation was created using voice recognition software. I have made every reasonable attempt to correct obvious errors, but I expect that there are errors of grammar  and possibly content that I did not discover before finalizing the note.

## 2023-03-01 NOTE — ASSESSMENT & PLAN NOTE
Chronic condition uncontrolled  -We will resume simvastatin at 20 mg daily but recommend to increase should LDL levels remain elevated  -Lipid panel will be repeated in 6 months

## 2023-03-01 NOTE — ASSESSMENT & PLAN NOTE
Chronic condition uncontrolled  - Recommend diligent efforts towards healthy diet and exercise as discussed.  Eating a Mediterranean style diet and getting at least 30 minutes of activity most days of the week

## 2023-03-01 NOTE — ASSESSMENT & PLAN NOTE
Chronic condition, biochemically stable therefore will continue with levothyroxine at 100 mcg daily

## 2023-03-01 NOTE — ASSESSMENT & PLAN NOTE
Chronic condition stable therefore we will continue Zoloft at 50 mg daily and refill this medication for today.

## 2023-04-18 PROBLEM — F32.5 MAJOR DEPRESSIVE DISORDER WITH SINGLE EPISODE, IN FULL REMISSION (HCC): Status: ACTIVE | Noted: 2023-04-18

## 2023-04-19 PROBLEM — I73.9 PAD (PERIPHERAL ARTERY DISEASE) (HCC): Status: ACTIVE | Noted: 2023-04-19

## 2023-06-02 ENCOUNTER — OFFICE VISIT (OUTPATIENT)
Dept: MEDICAL GROUP | Facility: PHYSICIAN GROUP | Age: 68
End: 2023-06-02
Payer: MEDICARE

## 2023-06-02 VITALS
TEMPERATURE: 97.4 F | DIASTOLIC BLOOD PRESSURE: 70 MMHG | OXYGEN SATURATION: 99 % | BODY MASS INDEX: 31.95 KG/M2 | HEIGHT: 68 IN | HEART RATE: 63 BPM | SYSTOLIC BLOOD PRESSURE: 120 MMHG | WEIGHT: 210.8 LBS

## 2023-06-02 DIAGNOSIS — G89.29 CHRONIC PAIN OF LEFT KNEE: ICD-10-CM

## 2023-06-02 DIAGNOSIS — M25.562 CHRONIC PAIN OF LEFT KNEE: ICD-10-CM

## 2023-06-02 PROCEDURE — 3078F DIAST BP <80 MM HG: CPT

## 2023-06-02 PROCEDURE — 3074F SYST BP LT 130 MM HG: CPT

## 2023-06-02 PROCEDURE — 99213 OFFICE O/P EST LOW 20 MIN: CPT

## 2023-06-02 ASSESSMENT — PATIENT HEALTH QUESTIONNAIRE - PHQ9
SUM OF ALL RESPONSES TO PHQ QUESTIONS 1-9: 0
SUM OF ALL RESPONSES TO PHQ9 QUESTIONS 1 AND 2: 0
5. POOR APPETITE OR OVEREATING: NOT AT ALL
9. THOUGHTS THAT YOU WOULD BE BETTER OFF DEAD, OR OF HURTING YOURSELF: NOT AT ALL
6. FEELING BAD ABOUT YOURSELF - OR THAT YOU ARE A FAILURE OR HAVE LET YOURSELF OR YOUR FAMILY DOWN: NOT AL ALL
8. MOVING OR SPEAKING SO SLOWLY THAT OTHER PEOPLE COULD HAVE NOTICED. OR THE OPPOSITE, BEING SO FIGETY OR RESTLESS THAT YOU HAVE BEEN MOVING AROUND A LOT MORE THAN USUAL: NOT AT ALL
1. LITTLE INTEREST OR PLEASURE IN DOING THINGS: NOT AT ALL
3. TROUBLE FALLING OR STAYING ASLEEP OR SLEEPING TOO MUCH: NOT AT ALL
7. TROUBLE CONCENTRATING ON THINGS, SUCH AS READING THE NEWSPAPER OR WATCHING TELEVISION: NOT AT ALL
4. FEELING TIRED OR HAVING LITTLE ENERGY: NOT AT ALL
2. FEELING DOWN, DEPRESSED, IRRITABLE, OR HOPELESS: NOT AT ALL

## 2023-06-02 ASSESSMENT — FIBROSIS 4 INDEX: FIB4 SCORE: 1.23

## 2023-06-02 NOTE — PROGRESS NOTES
"Subjective:     CC: knee pain    HPI:   Melissa Orellana presents today with    Problem   Chronic Pain of Left Knee    Onset: 2020, progressive  Location left knee  Radiation upper thigh  Duration constant  Character ache  Alleviated by NSAIDs helpful  Exacerbated by ascending and descending stairs, touching knees together  Associated symptoms, feels unstable  Xrays from 2021 show patellofemoral arthritis with subluxation  -Mom had severe osteoarthritis         Health Maintenance: Completed    ROS:  Review of Systems   Musculoskeletal:  Positive for joint pain.   All other systems reviewed and are negative.      Objective:     Exam:  /70 (BP Location: Left arm, Patient Position: Sitting, BP Cuff Size: Large adult)   Pulse 63   Temp 36.3 °C (97.4 °F) (Temporal)   Ht 1.727 m (5' 8\")   Wt 95.6 kg (210 lb 12.8 oz)   SpO2 99%   BMI 32.05 kg/m²  Body mass index is 32.05 kg/m².    Physical Exam  Constitutional:       General: She is not in acute distress.     Appearance: Normal appearance. She is not ill-appearing or toxic-appearing.   HENT:      Head: Normocephalic.   Eyes:      Conjunctiva/sclera: Conjunctivae normal.   Pulmonary:      Effort: Pulmonary effort is normal.   Skin:     General: Skin is warm and dry.   Neurological:      General: No focal deficit present.      Mental Status: She is alert and oriented to person, place, and time.   Psychiatric:         Mood and Affect: Mood normal.         Behavior: Behavior normal.     Examination of the left knee: Tenderness to palpation of the medial joint line. Negative tenderness to lateral joint line.  Negative tenderness to the medial or lateral tibial plateau. Positive tenderness to the medial femoral condyles. Tenderness to the medial and lateral patella facets. 5/5 strength of the extensor mechanism. Negative Xiomy's sign. negative Lachman's. Negative Anterior/Posterior drawer. Negative Varus/Valgus stress. There is no swelling. Sensation is grossly intact.  " 2+ DP pulse.         Labs:   No visits with results within 1 Month(s) from this visit.   Latest known visit with results is:   Hospital Outpatient Visit on 02/24/2023   Component Date Value    Hepatitis C Antibody 02/24/2023 Non-Reactive     WBC 02/24/2023 4.1 (L)     RBC 02/24/2023 4.45     Hemoglobin 02/24/2023 13.2     Hematocrit 02/24/2023 42.0     MCV 02/24/2023 94.4     MCH 02/24/2023 29.7     MCHC 02/24/2023 31.4 (L)     RDW 02/24/2023 46.4     Platelet Count 02/24/2023 254     MPV 02/24/2023 10.9     Neutrophils-Polys 02/24/2023 52.50     Lymphocytes 02/24/2023 32.30     Monocytes 02/24/2023 10.40     Eosinophils 02/24/2023 4.10     Basophils 02/24/2023 0.50     Immature Granulocytes 02/24/2023 0.20     Nucleated RBC 02/24/2023 0.00     Neutrophils (Absolute) 02/24/2023 2.16     Lymphs (Absolute) 02/24/2023 1.33     Monos (Absolute) 02/24/2023 0.43     Eos (Absolute) 02/24/2023 0.17     Baso (Absolute) 02/24/2023 0.02     Immature Granulocytes (a* 02/24/2023 0.01     NRBC (Absolute) 02/24/2023 0.00     Glycohemoglobin 02/24/2023 5.6     Est Avg Glucose 02/24/2023 114     Sodium 02/24/2023 142     Potassium 02/24/2023 4.4     Chloride 02/24/2023 109     Co2 02/24/2023 23     Anion Gap 02/24/2023 10.0     Glucose 02/24/2023 103 (H)     Bun 02/24/2023 18     Creatinine 02/24/2023 0.72     Calcium 02/24/2023 9.2     AST(SGOT) 02/24/2023 18     ALT(SGPT) 02/24/2023 14     Alkaline Phosphatase 02/24/2023 60     Total Bilirubin 02/24/2023 0.2     Albumin 02/24/2023 4.4     Total Protein 02/24/2023 7.0     Globulin 02/24/2023 2.6     A-G Ratio 02/24/2023 1.7     Cholesterol,Tot 02/24/2023 190     Triglycerides 02/24/2023 106     HDL 02/24/2023 57     LDL 02/24/2023 112 (H)     TSH 02/24/2023 0.990     Fasting Status 02/24/2023 Fasting     Correct Calcium 02/24/2023 8.9     GFR (CKD-EPI) 02/24/2023 92          Assessment & Plan: Medical Decision Making     67 y.o. female with the following -     1. Chronic pain of  left knee  Chronic-Uncontrolled  -RICE modalities encouraged-okay to use over-the-counter ibuprofen 400 to 600 mg every 8 hours if needed for pain  -Referal to orthopedic- Dr. Ezra KANG      Differential diagnosis, natural history, supportive care, and indications for immediate follow-up discussed.  Shared decision making approach utilized, and patient is amendable with plan of care.  Patient understands to return to clinic or go to the emergency department if symptoms worsen. All questions and concerns addressed to the best of my knowledge.    Return in about 3 months (around 9/2/2023).    Please note that this dictation was created using voice recognition software. I have made every reasonable attempt to correct obvious errors, but I expect that there are errors of grammar and possibly content that I did not discover before finalizing the note.

## 2023-07-10 DIAGNOSIS — N95.1 SYMPTOMATIC MENOPAUSAL OR FEMALE CLIMACTERIC STATES: ICD-10-CM

## 2023-07-10 NOTE — TELEPHONE ENCOUNTER
Received request via: Patient    Was the patient seen in the last year in this department? Yes    Does the patient have an active prescription (recently filled or refills available) for medication(s) requested? No.    Does the patient have half-way Plus and need 100 day supply (blood pressure, diabetes and cholesterol meds only)? Medication is not for cholesterol, blood pressure or diabetes

## 2023-08-02 RX ORDER — LEVOTHYROXINE SODIUM 0.1 MG/1
100 TABLET ORAL
Qty: 90 TABLET | Refills: 2 | Status: SHIPPED | OUTPATIENT
Start: 2023-08-02

## 2023-08-21 ENCOUNTER — HOSPITAL ENCOUNTER (OUTPATIENT)
Dept: LAB | Facility: MEDICAL CENTER | Age: 68
End: 2023-08-21
Payer: MEDICARE

## 2023-08-21 DIAGNOSIS — E89.0 POSTABLATIVE HYPOTHYROIDISM: ICD-10-CM

## 2023-08-21 DIAGNOSIS — E78.5 DYSLIPIDEMIA: ICD-10-CM

## 2023-08-21 DIAGNOSIS — R73.01 IFG (IMPAIRED FASTING GLUCOSE): ICD-10-CM

## 2023-08-21 DIAGNOSIS — E66.9 OBESITY (BMI 35.0-39.9 WITHOUT COMORBIDITY): ICD-10-CM

## 2023-08-21 DIAGNOSIS — D70.8 OTHER NEUTROPENIA (HCC): ICD-10-CM

## 2023-08-21 LAB
ALBUMIN SERPL BCP-MCNC: 4.5 G/DL (ref 3.2–4.9)
ALBUMIN/GLOB SERPL: 1.8 G/DL
ALP SERPL-CCNC: 60 U/L (ref 30–99)
ALT SERPL-CCNC: 11 U/L (ref 2–50)
ANION GAP SERPL CALC-SCNC: 12 MMOL/L (ref 7–16)
AST SERPL-CCNC: 17 U/L (ref 12–45)
BASOPHILS # BLD AUTO: 0.5 % (ref 0–1.8)
BASOPHILS # BLD: 0.02 K/UL (ref 0–0.12)
BILIRUB SERPL-MCNC: 0.3 MG/DL (ref 0.1–1.5)
BUN SERPL-MCNC: 16 MG/DL (ref 8–22)
CALCIUM ALBUM COR SERPL-MCNC: 9.2 MG/DL (ref 8.5–10.5)
CALCIUM SERPL-MCNC: 9.6 MG/DL (ref 8.5–10.5)
CHLORIDE SERPL-SCNC: 106 MMOL/L (ref 96–112)
CHOLEST SERPL-MCNC: 190 MG/DL (ref 100–199)
CO2 SERPL-SCNC: 22 MMOL/L (ref 20–33)
CREAT SERPL-MCNC: 0.8 MG/DL (ref 0.5–1.4)
EOSINOPHIL # BLD AUTO: 0.13 K/UL (ref 0–0.51)
EOSINOPHIL NFR BLD: 3.2 % (ref 0–6.9)
ERYTHROCYTE [DISTWIDTH] IN BLOOD BY AUTOMATED COUNT: 46.3 FL (ref 35.9–50)
EST. AVERAGE GLUCOSE BLD GHB EST-MCNC: 120 MG/DL
FASTING STATUS PATIENT QL REPORTED: NORMAL
GFR SERPLBLD CREATININE-BSD FMLA CKD-EPI: 81 ML/MIN/1.73 M 2
GLOBULIN SER CALC-MCNC: 2.5 G/DL (ref 1.9–3.5)
GLUCOSE SERPL-MCNC: 94 MG/DL (ref 65–99)
HBA1C MFR BLD: 5.8 % (ref 4–5.6)
HCT VFR BLD AUTO: 42.2 % (ref 37–47)
HDLC SERPL-MCNC: 47 MG/DL
HGB BLD-MCNC: 13.3 G/DL (ref 12–16)
IMM GRANULOCYTES # BLD AUTO: 0.02 K/UL (ref 0–0.11)
IMM GRANULOCYTES NFR BLD AUTO: 0.5 % (ref 0–0.9)
LDLC SERPL CALC-MCNC: 119 MG/DL
LYMPHOCYTES # BLD AUTO: 1.36 K/UL (ref 1–4.8)
LYMPHOCYTES NFR BLD: 33.8 % (ref 22–41)
MCH RBC QN AUTO: 30.1 PG (ref 27–33)
MCHC RBC AUTO-ENTMCNC: 31.5 G/DL (ref 32.2–35.5)
MCV RBC AUTO: 95.5 FL (ref 81.4–97.8)
MONOCYTES # BLD AUTO: 0.37 K/UL (ref 0–0.85)
MONOCYTES NFR BLD AUTO: 9.2 % (ref 0–13.4)
NEUTROPHILS # BLD AUTO: 2.12 K/UL (ref 1.82–7.42)
NEUTROPHILS NFR BLD: 52.8 % (ref 44–72)
NRBC # BLD AUTO: 0 K/UL
NRBC BLD-RTO: 0 /100 WBC (ref 0–0.2)
PLATELET # BLD AUTO: 269 K/UL (ref 164–446)
PMV BLD AUTO: 11.2 FL (ref 9–12.9)
POTASSIUM SERPL-SCNC: 4.2 MMOL/L (ref 3.6–5.5)
PROT SERPL-MCNC: 7 G/DL (ref 6–8.2)
RBC # BLD AUTO: 4.42 M/UL (ref 4.2–5.4)
SODIUM SERPL-SCNC: 140 MMOL/L (ref 135–145)
TRIGL SERPL-MCNC: 120 MG/DL (ref 0–149)
TSH SERPL DL<=0.005 MIU/L-ACNC: 2.03 UIU/ML (ref 0.38–5.33)
WBC # BLD AUTO: 4 K/UL (ref 4.8–10.8)

## 2023-08-21 PROCEDURE — 80053 COMPREHEN METABOLIC PANEL: CPT

## 2023-08-21 PROCEDURE — 36415 COLL VENOUS BLD VENIPUNCTURE: CPT

## 2023-08-21 PROCEDURE — 80061 LIPID PANEL: CPT

## 2023-08-21 PROCEDURE — 84443 ASSAY THYROID STIM HORMONE: CPT

## 2023-08-21 PROCEDURE — 83036 HEMOGLOBIN GLYCOSYLATED A1C: CPT

## 2023-08-21 PROCEDURE — 85025 COMPLETE CBC W/AUTO DIFF WBC: CPT

## 2023-08-25 ENCOUNTER — TELEPHONE (OUTPATIENT)
Dept: MEDICAL GROUP | Facility: PHYSICIAN GROUP | Age: 68
End: 2023-08-25

## 2023-08-25 ENCOUNTER — OFFICE VISIT (OUTPATIENT)
Dept: MEDICAL GROUP | Facility: PHYSICIAN GROUP | Age: 68
End: 2023-08-25
Payer: MEDICARE

## 2023-08-25 VITALS
WEIGHT: 210 LBS | DIASTOLIC BLOOD PRESSURE: 60 MMHG | TEMPERATURE: 96.6 F | HEIGHT: 68 IN | BODY MASS INDEX: 31.83 KG/M2 | HEART RATE: 82 BPM | SYSTOLIC BLOOD PRESSURE: 110 MMHG | OXYGEN SATURATION: 96 %

## 2023-08-25 DIAGNOSIS — E78.5 DYSLIPIDEMIA: ICD-10-CM

## 2023-08-25 DIAGNOSIS — E03.9 HYPOTHYROIDISM, UNSPECIFIED TYPE: ICD-10-CM

## 2023-08-25 DIAGNOSIS — Z87.440 HISTORY OF UTI: ICD-10-CM

## 2023-08-25 DIAGNOSIS — M25.562 CHRONIC PAIN OF LEFT KNEE: ICD-10-CM

## 2023-08-25 DIAGNOSIS — G89.29 CHRONIC PAIN OF LEFT KNEE: ICD-10-CM

## 2023-08-25 DIAGNOSIS — R73.03 PREDIABETES: ICD-10-CM

## 2023-08-25 PROCEDURE — 99214 OFFICE O/P EST MOD 30 MIN: CPT

## 2023-08-25 PROCEDURE — 3074F SYST BP LT 130 MM HG: CPT

## 2023-08-25 PROCEDURE — 3078F DIAST BP <80 MM HG: CPT

## 2023-08-25 RX ORDER — NITROFURANTOIN 25; 75 MG/1; MG/1
100 CAPSULE ORAL 2 TIMES DAILY
Qty: 10 CAPSULE | Refills: 0 | Status: SHIPPED | OUTPATIENT
Start: 2023-08-25

## 2023-08-25 RX ORDER — DICLOFENAC SODIUM 30 MG/G
1 GEL TOPICAL 2 TIMES DAILY PRN
Qty: 100 G | Refills: 1 | Status: SHIPPED | OUTPATIENT
Start: 2023-08-25

## 2023-08-25 ASSESSMENT — ENCOUNTER SYMPTOMS
CHILLS: 0
FEVER: 0
DIZZINESS: 0
DIARRHEA: 0
ABDOMINAL PAIN: 0
MYALGIAS: 0
WEIGHT LOSS: 0
VOMITING: 0
BLURRED VISION: 0
WEAKNESS: 0
CONSTIPATION: 0
SHORTNESS OF BREATH: 0
COUGH: 0
NAUSEA: 0
HEADACHES: 0

## 2023-08-25 ASSESSMENT — FIBROSIS 4 INDEX: FIB4 SCORE: 1.26

## 2023-08-25 NOTE — ASSESSMENT & PLAN NOTE
Chronic condition uncontrolled recommend follow-up with Dr. Munguia as scheduled  -Okay to use over-the-counter pain relievers such as ibuprofen and/or Tylenol, will also provide patient with diclofenac 3% gel to apply 1-2 times daily if needed for pain topically.  Patient has also been advised to obtain knee brace to use for stabilization and compression while traveling and to ice and elevate when she is not on her feet.

## 2023-08-25 NOTE — ASSESSMENT & PLAN NOTE
Chronic condition uncontrolled  -Patient would like to get off simvastatin and is amendable to cardiac CT scan therefore will order determine the need for statin lowering agent.  She may stop simvastatin at this time and obtain cardiac CT as discussed  -Lipid panel will be repeated in 6 months

## 2023-08-25 NOTE — ASSESSMENT & PLAN NOTE
Chronic condition-uncontrolled  -Exercise: At least 150 minutes of moderate aerobic activity per week or 75 minutes of vigorous aerobic activity per week, +2 days/week of strength training  - Healthy lifestyle and eating habits: Mediterranean-based diet (rich in fruits, vegetables, nuts and healthy oils), proper hydration and avoiding sugary beverages, adequate sleep hygiene-(allowing 7 to 8 hours of overnight sleep).

## 2023-08-25 NOTE — TELEPHONE ENCOUNTER
DOCUMENTATION OF PAR STATUS:    1. Name of Medication & Dose: diclofenac Sodiu gel     2. Name of Prescription Coverage Company & phone #: [email or web address]    3. Date Prior Auth Submitted: 08/25/2023    4. What information was given to obtain insurance decision?     5. Prior Auth Status? Pending    6. Patient Notified: no

## 2023-08-25 NOTE — PROGRESS NOTES
Subjective:     CC: Lab review    HPI:   Melissa Orellana presents today with    Problem   Prediabetes    Newly diagnosed condition  -Patient reports mostly healthy diet but she does like to indulge in carbohydrates at times and exercise has been more difficult secondary to left knee discomfort  Lab Results   Component Value Date/Time    HBA1C 5.8 (H) 08/21/2023 10:38 AM    HBA1C 5.6 02/24/2023 07:07 AM          History of Uti    Patient reports history of UTI in the past especially with travel.  She has upcoming trip planned to [place] and would like prophylactic antibiotic if needed for UTI symptoms.     Chronic Pain of Left Knee    Onset: 2020, progressive  Location left knee  Radiation upper thigh  Duration constant  Character ache  Alleviated by NSAIDs helpful  Exacerbated by ascending and descending stairs, touching knees together  Associated symptoms, feels unstable  Xrays from 2021 show patellofemoral arthritis with subluxation  -Mom had severe osteoarthritis  Patient most recently has been seeing Dr. Munguia at [facility].  She received steroid injection which did help temporarily for a few weeks but her pain has since returned.  She is concerned because she has an upcoming trip to [place] planned.     Hypothyroidism    Chronic condition for which patient has had chemical ablation secondary to Graves' disease. She has been stable on levothyroxine 100 mcg each day.     Dyslipidemia    Chronic condition for which patient takes simvastatin 20 mg daily.  Lab Results   Component Value Date/Time    CHOLSTRLTOT 190 08/21/2023 10:38 AM    CHOLSTRLTOT 190 02/24/2023 07:07 AM     (H) 08/21/2023 10:38 AM     (H) 02/24/2023 07:07 AM    HDL 47 08/21/2023 10:38 AM    HDL 57 02/24/2023 07:07 AM    TRIGLYCERIDE 120 08/21/2023 10:38 AM    TRIGLYCERIDE 106 02/24/2023 07:07 AM                Health Maintenance: Completed    ROS:  Review of Systems   Constitutional:  Negative for chills, fever, malaise/fatigue  "and weight loss.   Eyes:  Negative for blurred vision.   Respiratory:  Negative for cough and shortness of breath.    Cardiovascular:  Negative for chest pain.   Gastrointestinal:  Negative for abdominal pain, constipation, diarrhea, nausea and vomiting.   Musculoskeletal:  Positive for joint pain. Negative for myalgias.   Neurological:  Negative for dizziness, weakness and headaches.       Objective:     Exam:  /60 (BP Location: Left arm, Patient Position: Sitting, BP Cuff Size: Adult)   Pulse 82   Temp 35.9 °C (96.6 °F) (Temporal)   Ht 1.727 m (5' 8\")   Wt 95.3 kg (210 lb)   SpO2 96%   BMI 31.93 kg/m²  Body mass index is 31.93 kg/m².    Physical Exam  Constitutional:       General: She is not in acute distress.     Appearance: Normal appearance. She is not ill-appearing or toxic-appearing.   HENT:      Head: Normocephalic.   Eyes:      Conjunctiva/sclera: Conjunctivae normal.   Pulmonary:      Effort: Pulmonary effort is normal.   Skin:     General: Skin is warm and dry.   Neurological:      General: No focal deficit present.      Mental Status: She is alert and oriented to person, place, and time.   Psychiatric:         Mood and Affect: Mood normal.         Behavior: Behavior normal.         Labs:   Lab Results   Component Value Date/Time    CHOLSTRLTOT 190 08/21/2023 10:38 AM     (H) 08/21/2023 10:38 AM    HDL 47 08/21/2023 10:38 AM    TRIGLYCERIDE 120 08/21/2023 10:38 AM       Lab Results   Component Value Date/Time    SODIUM 140 08/21/2023 10:38 AM    POTASSIUM 4.2 08/21/2023 10:38 AM    CHLORIDE 106 08/21/2023 10:38 AM    CO2 22 08/21/2023 10:38 AM    GLUCOSE 94 08/21/2023 10:38 AM    BUN 16 08/21/2023 10:38 AM    CREATININE 0.80 08/21/2023 10:38 AM     Lab Results   Component Value Date/Time    ALKPHOSPHAT 60 08/21/2023 10:38 AM    ASTSGOT 17 08/21/2023 10:38 AM    ALTSGPT 11 08/21/2023 10:38 AM    TBILIRUBIN 0.3 08/21/2023 10:38 AM      Lab Results   Component Value Date/Time    HBA1C 5.8 " "(H) 08/21/2023 10:38 AM    HBA1C 5.6 02/24/2023 07:07 AM    HBA1C 5.6 08/05/2022 09:24 AM     No results found for: \"TSH\"  Lab Results   Component Value Date/Time    FREET4 0.74 12/04/2015 09:28 AM    FREET4 0.78 04/10/2015 08:40 AM     No results found for: \"CBC\"      Assessment & Plan: Medical Decision Making     68 y.o. female with the following -     Problem List Items Addressed This Visit       Dyslipidemia     Chronic condition uncontrolled  -Patient would like to get off simvastatin and is amendable to cardiac CT scan therefore will order determine the need for statin lowering agent.  She may stop simvastatin at this time and obtain cardiac CT as discussed  -Lipid panel will be repeated in 6 months         Relevant Orders    CT-CARDIAC SCORING    HEMOGLOBIN A1C    Comp Metabolic Panel    Lipid Profile    Hypothyroidism     Chronic condition stable therefore we will continue levothyroxine 100 mcg daily         Chronic pain of left knee     Chronic condition uncontrolled recommend follow-up with Dr. Munguia as scheduled  -Okay to use over-the-counter pain relievers such as ibuprofen and/or Tylenol, will also provide patient with diclofenac 3% gel to apply 1-2 times daily if needed for pain topically.  Patient has also been advised to obtain knee brace to use for stabilization and compression while traveling and to ice and elevate when she is not on her feet.           Relevant Medications    diclofenac sodium 3 % Gel    Prediabetes     Chronic condition-uncontrolled  -Exercise: At least 150 minutes of moderate aerobic activity per week or 75 minutes of vigorous aerobic activity per week, +2 days/week of strength training  - Healthy lifestyle and eating habits: Mediterranean-based diet (rich in fruits, vegetables, nuts and healthy oils), proper hydration and avoiding sugary beverages, adequate sleep hygiene-(allowing 7 to 8 hours of overnight sleep).           Relevant Orders    HEMOGLOBIN A1C    Comp Metabolic " Panel    History of UTI     Chronic condition currently stable  -Antibiotic provided prophylactically for need should UTI occur during travels, with symptoms including dysuria, urgency, frequency and abdominal pain         Relevant Medications    nitrofurantoin (MACROBID) 100 MG Cap       Differential diagnosis, natural history, supportive care, and indications for immediate follow-up discussed.  Shared decision making approach utilized, and patient is amendable with plan of care.  Patient understands to return to clinic or go to the emergency department if symptoms worsen. All questions and concerns addressed to the best of my knowledge.    Return in about 3 months (around 11/25/2023).    Please note that this dictation was created using voice recognition software. I have made every reasonable attempt to correct obvious errors, but I expect that there are errors of grammar and possibly content that I did not discover before finalizing the note.

## 2023-08-25 NOTE — ASSESSMENT & PLAN NOTE
Chronic condition currently stable  -Antibiotic provided prophylactically for need should UTI occur during travels, with symptoms including dysuria, urgency, frequency and abdominal pain

## 2023-08-29 NOTE — TELEPHONE ENCOUNTER
FINAL PRIOR AUTHORIZATION STATUS:    1.  Name of Medication & Dose: DICLOFENAC SODIUM GEL      2. Prior Auth Status: Denied.  Reason: SEE MEDIA     3. Action Taken: Pharmacy Notified: no Patient Notified: no

## 2023-10-10 ENCOUNTER — HOSPITAL ENCOUNTER (OUTPATIENT)
Dept: RADIOLOGY | Facility: MEDICAL CENTER | Age: 68
End: 2023-10-10
Payer: COMMERCIAL

## 2023-10-10 DIAGNOSIS — E78.5 DYSLIPIDEMIA: ICD-10-CM

## 2023-10-10 PROCEDURE — 4410556 CT-CARDIAC SCORING (SELF PAY ONLY)

## 2023-10-31 ENCOUNTER — HOSPITAL ENCOUNTER (OUTPATIENT)
Dept: RADIOLOGY | Facility: MEDICAL CENTER | Age: 68
End: 2023-10-31
Payer: MEDICARE

## 2023-10-31 DIAGNOSIS — Z12.31 ENCOUNTER FOR SCREENING MAMMOGRAM FOR MALIGNANT NEOPLASM OF BREAST: ICD-10-CM

## 2023-10-31 PROCEDURE — 77063 BREAST TOMOSYNTHESIS BI: CPT

## 2024-02-23 ENCOUNTER — HOSPITAL ENCOUNTER (OUTPATIENT)
Dept: LAB | Facility: MEDICAL CENTER | Age: 69
End: 2024-02-23
Payer: MEDICARE

## 2024-02-23 DIAGNOSIS — E78.5 DYSLIPIDEMIA: ICD-10-CM

## 2024-02-23 DIAGNOSIS — R73.03 PREDIABETES: ICD-10-CM

## 2024-02-23 LAB
EST. AVERAGE GLUCOSE BLD GHB EST-MCNC: 114 MG/DL
HBA1C MFR BLD: 5.6 % (ref 4–5.6)

## 2024-02-23 PROCEDURE — 80053 COMPREHEN METABOLIC PANEL: CPT

## 2024-02-23 PROCEDURE — 80061 LIPID PANEL: CPT

## 2024-02-23 PROCEDURE — 36415 COLL VENOUS BLD VENIPUNCTURE: CPT

## 2024-02-23 PROCEDURE — 83036 HEMOGLOBIN GLYCOSYLATED A1C: CPT

## 2024-02-24 LAB
ALBUMIN SERPL BCP-MCNC: 4 G/DL (ref 3.2–4.9)
ALBUMIN/GLOB SERPL: 1.5 G/DL
ALP SERPL-CCNC: 58 U/L (ref 30–99)
ALT SERPL-CCNC: 16 U/L (ref 2–50)
ANION GAP SERPL CALC-SCNC: 12 MMOL/L (ref 7–16)
AST SERPL-CCNC: 21 U/L (ref 12–45)
BILIRUB SERPL-MCNC: 0.2 MG/DL (ref 0.1–1.5)
BUN SERPL-MCNC: 19 MG/DL (ref 8–22)
CALCIUM ALBUM COR SERPL-MCNC: 9 MG/DL (ref 8.5–10.5)
CALCIUM SERPL-MCNC: 9 MG/DL (ref 8.5–10.5)
CHLORIDE SERPL-SCNC: 107 MMOL/L (ref 96–112)
CHOLEST SERPL-MCNC: 264 MG/DL (ref 100–199)
CO2 SERPL-SCNC: 23 MMOL/L (ref 20–33)
CREAT SERPL-MCNC: 0.78 MG/DL (ref 0.5–1.4)
GFR SERPLBLD CREATININE-BSD FMLA CKD-EPI: 83 ML/MIN/1.73 M 2
GLOBULIN SER CALC-MCNC: 2.7 G/DL (ref 1.9–3.5)
GLUCOSE SERPL-MCNC: 94 MG/DL (ref 65–99)
HDLC SERPL-MCNC: 59 MG/DL
LDLC SERPL CALC-MCNC: 189 MG/DL
POTASSIUM SERPL-SCNC: 5.2 MMOL/L (ref 3.6–5.5)
PROT SERPL-MCNC: 6.7 G/DL (ref 6–8.2)
SODIUM SERPL-SCNC: 142 MMOL/L (ref 135–145)
TRIGL SERPL-MCNC: 82 MG/DL (ref 0–149)

## 2024-02-27 ENCOUNTER — OFFICE VISIT (OUTPATIENT)
Dept: MEDICAL GROUP | Facility: PHYSICIAN GROUP | Age: 69
End: 2024-02-27
Payer: MEDICARE

## 2024-02-27 VITALS
SYSTOLIC BLOOD PRESSURE: 114 MMHG | BODY MASS INDEX: 34.62 KG/M2 | HEART RATE: 65 BPM | TEMPERATURE: 97.7 F | OXYGEN SATURATION: 97 % | WEIGHT: 220.6 LBS | HEIGHT: 67 IN | DIASTOLIC BLOOD PRESSURE: 80 MMHG

## 2024-02-27 DIAGNOSIS — R73.03 PREDIABETES: ICD-10-CM

## 2024-02-27 DIAGNOSIS — E78.5 DYSLIPIDEMIA: ICD-10-CM

## 2024-02-27 DIAGNOSIS — D70.8 OTHER NEUTROPENIA (HCC): ICD-10-CM

## 2024-02-27 DIAGNOSIS — F32.5 MAJOR DEPRESSIVE DISORDER WITH SINGLE EPISODE, IN FULL REMISSION (HCC): ICD-10-CM

## 2024-02-27 DIAGNOSIS — E03.9 HYPOTHYROIDISM, UNSPECIFIED TYPE: ICD-10-CM

## 2024-02-27 PROCEDURE — 3079F DIAST BP 80-89 MM HG: CPT

## 2024-02-27 PROCEDURE — 99214 OFFICE O/P EST MOD 30 MIN: CPT

## 2024-02-27 PROCEDURE — 3074F SYST BP LT 130 MM HG: CPT

## 2024-02-27 RX ORDER — ROSUVASTATIN CALCIUM 5 MG/1
5 TABLET, COATED ORAL EVERY EVENING
Qty: 100 TABLET | Refills: 3 | Status: SHIPPED | OUTPATIENT
Start: 2024-02-27

## 2024-02-27 RX ORDER — ROSUVASTATIN CALCIUM 5 MG/1
5 TABLET, COATED ORAL EVERY EVENING
Qty: 30 TABLET | Refills: 11 | Status: SHIPPED | OUTPATIENT
Start: 2024-02-27 | End: 2024-02-27

## 2024-02-27 ASSESSMENT — ENCOUNTER SYMPTOMS
BLURRED VISION: 0
ABDOMINAL PAIN: 0
HEADACHES: 0
CHILLS: 0
MYALGIAS: 0
SHORTNESS OF BREATH: 0
NAUSEA: 0
DIARRHEA: 0
DIZZINESS: 0
CONSTIPATION: 0
VOMITING: 0
WEAKNESS: 0
FEVER: 0
WEIGHT LOSS: 0
COUGH: 0

## 2024-02-27 ASSESSMENT — FIBROSIS 4 INDEX: FIB4 SCORE: 1.29

## 2024-02-27 ASSESSMENT — PATIENT HEALTH QUESTIONNAIRE - PHQ9: CLINICAL INTERPRETATION OF PHQ2 SCORE: 0

## 2024-02-27 NOTE — PROGRESS NOTES
Verbal consent was acquired by the patient to use [employer] ambient listening note generation during this visit  Subjective:     CC: lab review    HPI:   Melissa Orellana presents today with  History of Present Illness  The patient is a 68-year-old female here to talk about labs and weight loss.    She weighed 210 pounds in the last 6 months, but now she weighs 220 pounds. She is not a gym person, but she started walking again in the fall. She is planning to start riding the regular bike. She used to walk a mile a day, but then her knees started acting up. She thinks she will need a knee replacement eventually, but she is trying to save it now.    She stopped taking simvastatin. She would like to go back on the statin because it is making her nervous. She did not have any muscle aches on the simvastatin.    She was put on Zoloft for major menopause in 2004. She started gaining weight when she started Zoloft. She has never been depressed. She had a lot of anxiety when she was working, but she is retired now.    She had Graves' disease. She saw an endocrinologist for years, but she stopped seeing them.    She got a bladder infection 10 years ago that was so severe that it made her nervous. She never had a bladder infection. She was given a medication to take when she travels.        No problems updated.      ROS:  Review of Systems   Constitutional:  Negative for chills, fever, malaise/fatigue and weight loss.   Eyes:  Negative for blurred vision.   Respiratory:  Negative for cough and shortness of breath.    Cardiovascular:  Negative for chest pain.   Gastrointestinal:  Negative for abdominal pain, constipation, diarrhea, nausea and vomiting.   Musculoskeletal:  Positive for joint pain. Negative for myalgias.   Neurological:  Negative for dizziness, weakness and headaches.       Objective:     Exam:  /80 (BP Location: Left arm, Patient Position: Sitting, BP Cuff Size: Adult)   Pulse 65   Temp 36.5 °C (97.7 °F)  "(Temporal)   Ht 1.702 m (5' 7\")   Wt 100 kg (220 lb 9.6 oz)   SpO2 97%   BMI 34.55 kg/m²  Body mass index is 34.55 kg/m².    Physical Exam  Constitutional:       General: She is not in acute distress.     Appearance: Normal appearance. She is not ill-appearing or toxic-appearing.   HENT:      Head: Normocephalic.   Eyes:      Conjunctiva/sclera: Conjunctivae normal.   Cardiovascular:      Rate and Rhythm: Normal rate and regular rhythm.      Pulses: Normal pulses.      Heart sounds: Normal heart sounds. No murmur heard.  Pulmonary:      Effort: Pulmonary effort is normal. No respiratory distress.      Breath sounds: Normal breath sounds.   Skin:     General: Skin is warm and dry.   Neurological:      General: No focal deficit present.      Mental Status: She is alert and oriented to person, place, and time.   Psychiatric:         Mood and Affect: Mood normal.         Behavior: Behavior normal.         Labs:   Hospital Outpatient Visit on 02/23/2024   Component Date Value    Cholesterol,Tot 02/23/2024 264 (H)     Triglycerides 02/23/2024 82     HDL 02/23/2024 59     LDL 02/23/2024 189 (H)     Sodium 02/23/2024 142     Potassium 02/23/2024 5.2     Chloride 02/23/2024 107     Co2 02/23/2024 23     Anion Gap 02/23/2024 12.0     Glucose 02/23/2024 94     Bun 02/23/2024 19     Creatinine 02/23/2024 0.78     Calcium 02/23/2024 9.0     Correct Calcium 02/23/2024 9.0     AST(SGOT) 02/23/2024 21     ALT(SGPT) 02/23/2024 16     Alkaline Phosphatase 02/23/2024 58     Total Bilirubin 02/23/2024 0.2     Albumin 02/23/2024 4.0     Total Protein 02/23/2024 6.7     Globulin 02/23/2024 2.7     A-G Ratio 02/23/2024 1.5     Glycohemoglobin 02/23/2024 5.6     Est Avg Glucose 02/23/2024 114     GFR (CKD-EPI) 02/23/2024 83          Assessment & Plan: Medical Decision Making     68 y.o. female with the following -   Assessment & Plan  1. Elevated cholesterol.  Her cardiac CT scan is reassuring that her cholesterol is not ultimately " affecting her heart. I recommended a healthy Mediterranean style diet, fruits, vegetables, lean meats, healthy fats, and moving at least 30 minutes most days of the week. I will start her on rosuvastatin 5 mg 1 pill every night.    2. Prediabetes.  She is out of prediabetic range. Her A1c went from 5.8 to 5.6.    3. Weight loss.  We discussed phentermine and injections. We discussed the side effects, risks, and benefits.    4. Knee pain.  She does not have poor circulation to her legs. Her legs do not hurt other than her knee when she is walking up a hill. She was advised to ride a regular bike. She was advised to  a set of hand weights at home.    5. Neutropenia.  This is stable. We will keep watching that each year.    6. Depression.  She will cut her Zoloft in half to 25 mg, then every other day she will do 50 mg, 25 mg, 50 mg, and 25 mg. She will slowly wean herself off Zoloft.    7. Graves' disease.  Her thyroid was checked in 08/2023 and it was perfect. I will check her thyroid once a year. Continue levothyroxine 100 mg daily, Will check TSH/T4 next lab draw.     8. Bladder infection.  She can take medication as needed for UTI during travel, nitrofurantin 100 mg BID    Follow-up  The patient will follow up in 6 months with labs.    Problem List Items Addressed This Visit       Dyslipidemia    Relevant Medications    rosuvastatin (CRESTOR) 5 MG Tab    Other Relevant Orders    Comp Metabolic Panel    Lipid Profile    Other neutropenia (HCC)    Relevant Orders    CBC WITH DIFFERENTIAL    Hypothyroidism    Relevant Orders    TSH+FREE T4    Major depressive disorder with single episode, in full remission (HCC)    Prediabetes    Relevant Orders    HEMOGLOBIN A1C     HCC Gap Form    Diagnosis: D70.8 - Other neutropenia (HCC)  Assessment and plan: Chronic, stable. Continue with current defined treatment plan: . Follow-up at least annually.  Diagnosis: F32.5 - Major depressive disorder with single episode, in  full remission (HCC)  Assessment and plan: Chronic, stable. Continue with current defined treatment plan: . Follow-up at least annually.  Last edited 02/27/24 07:30 PST by Devon Barraza D.N.P.           Differential diagnosis, natural history, supportive care, and indications for immediate follow-up discussed.  Shared decision making approach utilized, and patient is amendable with plan of care.  Patient understands to return to clinic or go to the emergency department if symptoms worsen. All questions and concerns addressed to the best of my knowledge.    Return in about 6 months (around 8/27/2024).    Please note that this dictation was created using voice recognition software. I have made every reasonable attempt to correct obvious errors, but I expect that there are errors of grammar and possibly content that I did not discover before finalizing the note.

## 2024-04-03 ENCOUNTER — TELEPHONE (OUTPATIENT)
Dept: HEALTH INFORMATION MANAGEMENT | Facility: OTHER | Age: 69
End: 2024-04-03
Payer: MEDICARE

## 2024-04-11 ENCOUNTER — TELEPHONE (OUTPATIENT)
Dept: FAMILY PLANNING/WOMEN'S HEALTH CLINIC | Facility: PHYSICIAN GROUP | Age: 69
End: 2024-04-11
Payer: MEDICARE

## 2024-04-11 NOTE — TELEPHONE ENCOUNTER
LVM reminding apt with CHAP on 4/15/24 at 4:20 check in 4:00 at [facility] [address].

## 2024-04-15 ENCOUNTER — OFFICE VISIT (OUTPATIENT)
Dept: FAMILY PLANNING/WOMEN'S HEALTH CLINIC | Facility: PHYSICIAN GROUP | Age: 69
End: 2024-04-15
Payer: MEDICARE

## 2024-04-15 VITALS
WEIGHT: 219 LBS | DIASTOLIC BLOOD PRESSURE: 70 MMHG | BODY MASS INDEX: 34.37 KG/M2 | HEIGHT: 67 IN | SYSTOLIC BLOOD PRESSURE: 120 MMHG

## 2024-04-15 DIAGNOSIS — E78.5 DYSLIPIDEMIA: ICD-10-CM

## 2024-04-15 DIAGNOSIS — F32.5 MAJOR DEPRESSIVE DISORDER WITH SINGLE EPISODE, IN FULL REMISSION (HCC): ICD-10-CM

## 2024-04-15 DIAGNOSIS — R73.03 PREDIABETES: ICD-10-CM

## 2024-04-15 DIAGNOSIS — E03.9 HYPOTHYROIDISM, UNSPECIFIED TYPE: ICD-10-CM

## 2024-04-15 DIAGNOSIS — D70.8 OTHER NEUTROPENIA (HCC): ICD-10-CM

## 2024-04-15 PROCEDURE — 3078F DIAST BP <80 MM HG: CPT

## 2024-04-15 PROCEDURE — 1125F AMNT PAIN NOTED PAIN PRSNT: CPT

## 2024-04-15 PROCEDURE — 3074F SYST BP LT 130 MM HG: CPT

## 2024-04-15 PROCEDURE — G0439 PPPS, SUBSEQ VISIT: HCPCS

## 2024-04-15 SDOH — ECONOMIC STABILITY: INCOME INSECURITY: HOW HARD IS IT FOR YOU TO PAY FOR THE VERY BASICS LIKE FOOD, HOUSING, MEDICAL CARE, AND HEATING?: NOT HARD AT ALL

## 2024-04-15 SDOH — ECONOMIC STABILITY: FOOD INSECURITY: WITHIN THE PAST 12 MONTHS, THE FOOD YOU BOUGHT JUST DIDN'T LAST AND YOU DIDN'T HAVE MONEY TO GET MORE.: NEVER TRUE

## 2024-04-15 SDOH — ECONOMIC STABILITY: FOOD INSECURITY: WITHIN THE PAST 12 MONTHS, YOU WORRIED THAT YOUR FOOD WOULD RUN OUT BEFORE YOU GOT MONEY TO BUY MORE.: NEVER TRUE

## 2024-04-15 SDOH — ECONOMIC STABILITY: TRANSPORTATION INSECURITY
IN THE PAST 12 MONTHS, HAS THE LACK OF TRANSPORTATION KEPT YOU FROM MEDICAL APPOINTMENTS OR FROM GETTING MEDICATIONS?: NO

## 2024-04-15 SDOH — ECONOMIC STABILITY: TRANSPORTATION INSECURITY
IN THE PAST 12 MONTHS, HAS LACK OF TRANSPORTATION KEPT YOU FROM MEETINGS, WORK, OR FROM GETTING THINGS NEEDED FOR DAILY LIVING?: NO

## 2024-04-15 ASSESSMENT — PATIENT HEALTH QUESTIONNAIRE - PHQ9: CLINICAL INTERPRETATION OF PHQ2 SCORE: 0

## 2024-04-15 ASSESSMENT — FIBROSIS 4 INDEX: FIB4 SCORE: 1.29

## 2024-04-15 ASSESSMENT — ACTIVITIES OF DAILY LIVING (ADL): BATHING_REQUIRES_ASSISTANCE: 0

## 2024-04-15 ASSESSMENT — ENCOUNTER SYMPTOMS: GENERAL WELL-BEING: GOOD

## 2024-04-15 ASSESSMENT — PAIN SCALES - GENERAL: PAINLEVEL: 2=MINIMAL-SLIGHT

## 2024-04-15 NOTE — ASSESSMENT & PLAN NOTE
Chronic, stable.  Last A1c was 5.6 in January of 2024.  No current treatment. Follow-up at least annually.

## 2024-04-15 NOTE — ASSESSMENT & PLAN NOTE
Chronic, stable. The patient reports that she just started swimming class. She is trying to eat healthier.  Follow-up at least annually.

## 2024-04-15 NOTE — ASSESSMENT & PLAN NOTE
Chronic, stable. The patient denies any symptoms at this time. Continue with current defined treatment plan: levothyroxine (SYNTHROID) 100 MCG Tab . Follow-up at least annually.

## 2024-04-15 NOTE — ASSESSMENT & PLAN NOTE
Chronic, stable. The patient denies any signs or symptoms of infections at this time. Continue with current defined treatment plan: surveillance. Follow-up at least annually.

## 2024-04-15 NOTE — ASSESSMENT & PLAN NOTE
Chronic, stable. The patient denies any side effects from the current medication. Continue with current defined treatment plan: rosuvastatin (CRESTOR) 5 MG Tab  . Follow-up at least annually.

## 2024-04-15 NOTE — ASSESSMENT & PLAN NOTE
Chronic, stable. The patient denies any depressive signs or symptoms. She reports good mood. PHQ-9 score today is 0. Continue with current defined treatment plan: sertraline (ZOLOFT) 50 MG Tab . Follow-up at least annually.

## 2024-04-15 NOTE — PROGRESS NOTES
Comprehensive Health Assessment Program     Melissa Escamilla is a 68 y.o. here for her comprehensive health assessment.    Patient Active Problem List    Diagnosis Date Noted    Prediabetes 08/25/2023    History of UTI 08/25/2023    Chronic pain of left knee 06/02/2023    Major depressive disorder with single episode, in full remission (HCC) 04/18/2023    IFG (impaired fasting glucose) 02/28/2023    Other neutropenia (HCC) 11/06/2017    BMI 34.0-34.9,adult 06/13/2017    Dysuria 03/28/2016    Hypothyroidism 12/09/2014    Granuloma annulare     Dyslipidemia     Symptomatic menopausal or female climacteric states        Current Outpatient Medications   Medication Sig Dispense Refill    rosuvastatin (CRESTOR) 5 MG Tab Take 1 Tablet by mouth every evening. 100 Tablet 3    diclofenac sodium 3 % Gel Apply 1 Application topically 2 times a day as needed (left knee pain). 100 g 1    nitrofurantoin (MACROBID) 100 MG Cap Take 1 Capsule by mouth 2 times a day. 10 Capsule 0    levothyroxine (SYNTHROID) 100 MCG Tab Take 1 Tablet by mouth every morning on an empty stomach. 90 Tablet 2    sertraline (ZOLOFT) 50 MG Tab Take 1 Tablet by mouth every day. 90 Tablet 1    Cholecalciferol (VITAMIN D-3) 5000 UNIT TABS Take  by mouth. 1 tab 2 x / week        No current facility-administered medications for this visit.          Current supplements as per medication list.     Allergies:   Patient has no known allergies.  Social History     Tobacco Use    Smoking status: Never    Smokeless tobacco: Never    Tobacco comments:     1 cig/day    Vaping Use    Vaping Use: Never used   Substance Use Topics    Alcohol use: Yes     Alcohol/week: 1.8 oz     Types: 3 Standard drinks or equivalent per week     Comment: socially    Drug use: No     Family History   Problem Relation Age of Onset    Arthritis Mother         osteoarthritis    Thyroid Mother     Hypertension Mother     Dementia Mother     No Known Problems Father      Melissa Orellana  has a  past medical history of Dyslipidemia, Granuloma annulare, Graves disease, Hypothyroidism, and Symptomatic menopausal or female climacteric states.    She has no past medical history of Breast cancer (HCC).   Past Surgical History:   Procedure Laterality Date    COLONOSCOPY  02/14/2018    small int hemorrhoids    GASTROSCOPY-ENDO  10/10/2012    normal esophagus, stomach and duodenum    COLONOSCOPY WITH POLYP  10/10/2012    colon polyp-adenomatous, hemorrhoids    COLONOSCOPY  age 47    normal    KNEE ARTHROSCOPY Left     PRIMARY C SECTION      TONSILLECTOMY         Screening:  In the last six months have you experienced any leakage of urine? No    Depression Screening  Little interest or pleasure in doing things?  0 - not at all  Feeling down, depressed , or hopeless? 0 - not at all  Patient Health Questionnaire Score: 0     If depressive symptoms identified deferred to follow up visit unless specifically addressed in assessment and plan.    Interpretation of PHQ-9 Total Score   Score Severity   1-4 No Depression   5-9 Mild Depression   10-14 Moderate Depression   15-19 Moderately Severe Depression   20-27 Severe Depression    Screening for Cognitive Impairment  Do you or any of your friends or family members have any concern about your memory? No  Three Minute Recall (Leader, Season, Table) 3/3    Luis clock face with all 12 numbers and set the hands to show 10 minutes after 11.  Yes    Cognitive concerns identified deferred for follow up unless specifically addressed in assessment and plan.    Fall Risk Assessment  Has the patient had two or more falls in the last year or any fall with injury in the last year?  No    Safety Assessment  Do you always wear your seatbelt?  Yes  Any changes to home needed to function safely? No  Difficulty hearing.  No  Patient counseled about all safety risks that were identified.    Functional Assessment ADLs  Are there any barriers preventing you from cooking for yourself or meeting  nutritional needs?  No.    Are there any barriers preventing you from driving safely or obtaining transportation?  No.    Are there any barriers preventing you from using a telephone or calling for help?  No    Are there any barriers preventing you from shopping?  No.    Are there any barriers preventing you from taking care of your own finances?  No    Are there any barriers preventing you from managing your medications?  No    Are there any barriers preventing you from showering, bathing or dressing yourself? No    Are there any barriers preventing you from doing housework or laundry? No  Are there any barriers preventing you from using the toilet?No  Are you currently engaging in any exercise or physical activity?  Yes.      Self-Assessment of Health  What is your perception of your health? Good  Do you sleep more than six hours a night? Yes  In the past 7 days, how much did pain keep you from doing your normal work? Some  Do you spend quality time with family or friends (virtually or in person)? Yes  Do you usually eat a heart healthy diet that constists of a variety of fruits, vegetables, whole grains and fiber? Yes  Do you eat foods high in fat and/or Fast Food more than three times per week? Yes    Advance Care Planning  Do you have an Advance Directive, Living Will, Durable Power of , or POLST? No                 Health Maintenance Summary            Overdue - Zoster (Shingles) Vaccines (1 of 2) Never done      No completion history exists for this topic.              Overdue - COVID-19 Vaccine (3 - 2023-24 season) Overdue since 9/1/2023 04/16/2021  Imm Admin: PFIZER PURPLE CAP SARS-COV-2 VACCINATION (12+)    03/23/2021  Imm Admin: PFIZER PURPLE CAP SARS-COV-2 VACCINATION (12+)              IMM DTaP/Tdap/Td Vaccine (2 - Td or Tdap) Due soon on 4/22/2024 04/22/2014  Imm Admin: Tdap Vaccine              Influenza Vaccine (Season Ended) Next due on 9/1/2024 11/16/2020  Imm Admin:  Influenza Vaccine Quad Inj (Pf)    10/16/2019  Imm Admin: Influenza Vaccine Quad Inj (Pf)    09/19/2018  Imm Admin: Influenza Vaccine Quad Inj (Pf)    12/12/2017  Imm Admin: Influenza Vaccine Quad Inj (Pf)    09/20/2016  Imm Admin: Influenza Vaccine Quad Inj (Pf)    Only the first 5 history entries have been loaded, but more history exists.              Annual Wellness Visit (Yearly) Next due on 4/15/2025      04/15/2024  Level of Service: ANNUAL WELLNESS VISIT-INCLUDES PPPS SUBSEQUE*    04/18/2023  Level of Service: [place] ANNUAL WELLNESS VISIT-INCLUDES PPPS SUBSEQUE*              Mammogram (Every 2 Years) Next due on 10/31/2025      10/31/2023  MA-SCREENING MAMMO BILAT W/TOMOSYNTHESIS W/CAD    09/12/2022  MA-SCREENING MAMMO BILAT W/TOMOSYNTHESIS W/CAD    03/02/2021  [id number]    10/10/2019  [id number]    08/30/2018  MA-MAMMO SCREENING BILAT W/RETA W/CAD    Only the first 5 history entries have been loaded, but more history exists.              Bone Density Scan (Every 5 Years) Next due on 9/12/2027 09/12/2022  DS-BONE DENSITY STUDY (DEXA)              Colorectal Cancer Screening (Colonoscopy - Preferred) Next due on 2/14/2028 02/14/2018  REFERRAL TO GI FOR COLONOSCOPY              Pneumococcal Vaccine: 65+ Years (Series Information) Completed      08/11/2022  Imm Admin: Pneumococcal Conjugate Vaccine (PCV20)              Hepatitis C Screening  Completed      02/24/2023  Hepatitis C Antibody component of HEP C VIRUS ANTIBODY              Hepatitis A Vaccine (Hep A) (Series Information) Aged Out      No completion history exists for this topic.              Hepatitis B Vaccine (Hep B) (Series Information) Aged Out      No completion history exists for this topic.              HPV Vaccines (Series Information) Aged Out      No completion history exists for this topic.              Polio Vaccine (Inactivated Polio) (Series Information) Aged Out      No completion history exists for this  "topic.              Meningococcal Immunization (Series Information) Aged Out      No completion history exists for this topic.              Discontinued - Cervical Cancer Screening  Discontinued        Frequency changed to Never automatically (Topic No Longer Applies)    08/05/2013  Done                    Patient Care Team:  Devon Barraza D.N.P. as PCP - General (Nurse Practitioner Family)  Frank Carvajal M.D. (Inactive) as Consulting Physician (OB & Gyn - Gynecology)      Financial Resource Strain: Low Risk  (4/15/2024)    Overall Financial Resource Strain (CARDIA)     Difficulty of Paying Living Expenses: Not hard at all      Transportation Needs: No Transportation Needs (4/15/2024)    PRAPARE - Transportation     Lack of Transportation (Medical): No     Lack of Transportation (Non-Medical): No      Food Insecurity: No Food Insecurity (4/15/2024)    Hunger Vital Sign     Worried About Running Out of Food in the Last Year: Never true     Ran Out of Food in the Last Year: Never true        Encounter Vitals  Blood Pressure : 120/70  Weight: 99.3 kg (219 lb)  Height: 170.2 cm (5' 7\")  BMI (Calculated): 34.3  Pain Score: 2=Minimal-Slight     Alert, oriented in no acute distress.  Eye contact is good, speech goal directed, affect calm.    Assessment and Plan. The following treatment and monitoring plan is recommended:  Dyslipidemia  Chronic, stable. The patient denies any side effects from the current medication. Continue with current defined treatment plan: rosuvastatin (CRESTOR) 5 MG Tab  . Follow-up at least annually.      Other neutropenia (HCC)  Chronic, stable. The patient denies any signs or symptoms of infections at this time. Continue with current defined treatment plan: surveillance. Follow-up at least annually.      Hypothyroidism  Chronic, stable. The patient denies any symptoms at this time. Continue with current defined treatment plan: levothyroxine (SYNTHROID) 100 MCG Tab . Follow-up at least annually. "     Major depressive disorder with single episode, in full remission (HCC)  Chronic, stable. The patient denies any depressive signs or symptoms. She reports good mood. PHQ-9 score today is 0. Continue with current defined treatment plan: sertraline (ZOLOFT) 50 MG Tab . Follow-up at least annually.      Prediabetes  Chronic, stable.  Last A1c was 5.6 in January of 2024.  No current treatment. Follow-up at least annually.      BMI 34.0-34.9,adult  Chronic, stable. The patient reports that she just started swimming class. She is trying to eat healthier.  Follow-up at least annually.      Services suggested: No services needed at this time  Health Care Screening: Age-appropriate preventive services recommended by USPTF and ACIP covered by Medicare were discussed today. Services ordered if indicated and agreed upon by the patient.  Referrals offered: Community-based lifestyle interventions to reduce health risks and promote self-management and wellness, fall prevention, nutrition, physical activity, tobacco-use cessation, weight loss, and mental health services as per orders if indicated.    Discussion today about general wellness and lifestyle habits:    Prevent falls and reduce trip hazards; Cautioned about securing or removing rugs.  Have a working fire alarm and carbon monoxide detector.  Engage in regular physical activity and social activities.    Follow-up: Return for appointment with Primary Care Provider as needed.

## 2024-05-14 DIAGNOSIS — F32.5 MAJOR DEPRESSIVE DISORDER WITH SINGLE EPISODE, IN FULL REMISSION (HCC): ICD-10-CM

## 2024-05-14 RX ORDER — SERTRALINE HYDROCHLORIDE 25 MG/1
25 TABLET, FILM COATED ORAL DAILY
Qty: 90 TABLET | Refills: 3 | Status: SHIPPED | OUTPATIENT
Start: 2024-05-14

## 2024-07-07 RX ORDER — LEVOTHYROXINE SODIUM 0.1 MG/1
100 TABLET ORAL
Qty: 90 TABLET | Refills: 3 | Status: SHIPPED | OUTPATIENT
Start: 2024-07-07

## 2024-09-23 ENCOUNTER — HOSPITAL ENCOUNTER (OUTPATIENT)
Dept: LAB | Facility: MEDICAL CENTER | Age: 69
End: 2024-09-23
Payer: MEDICARE

## 2024-09-23 DIAGNOSIS — E78.5 DYSLIPIDEMIA: ICD-10-CM

## 2024-09-23 DIAGNOSIS — R73.03 PREDIABETES: ICD-10-CM

## 2024-09-23 DIAGNOSIS — D70.8 OTHER NEUTROPENIA (HCC): ICD-10-CM

## 2024-09-23 LAB
BASOPHILS # BLD AUTO: 0.5 % (ref 0–1.8)
BASOPHILS # BLD: 0.02 K/UL (ref 0–0.12)
EOSINOPHIL # BLD AUTO: 0.1 K/UL (ref 0–0.51)
EOSINOPHIL NFR BLD: 2.3 % (ref 0–6.9)
ERYTHROCYTE [DISTWIDTH] IN BLOOD BY AUTOMATED COUNT: 47.7 FL (ref 35.9–50)
EST. AVERAGE GLUCOSE BLD GHB EST-MCNC: 114 MG/DL
HBA1C MFR BLD: 5.6 % (ref 4–5.6)
HCT VFR BLD AUTO: 42.2 % (ref 37–47)
HGB BLD-MCNC: 13.1 G/DL (ref 12–16)
IMM GRANULOCYTES # BLD AUTO: 0.01 K/UL (ref 0–0.11)
IMM GRANULOCYTES NFR BLD AUTO: 0.2 % (ref 0–0.9)
LYMPHOCYTES # BLD AUTO: 1.33 K/UL (ref 1–4.8)
LYMPHOCYTES NFR BLD: 30.7 % (ref 22–41)
MCH RBC QN AUTO: 29.9 PG (ref 27–33)
MCHC RBC AUTO-ENTMCNC: 31 G/DL (ref 32.2–35.5)
MCV RBC AUTO: 96.3 FL (ref 81.4–97.8)
MONOCYTES # BLD AUTO: 0.44 K/UL (ref 0–0.85)
MONOCYTES NFR BLD AUTO: 10.2 % (ref 0–13.4)
NEUTROPHILS # BLD AUTO: 2.43 K/UL (ref 1.82–7.42)
NEUTROPHILS NFR BLD: 56.1 % (ref 44–72)
NRBC # BLD AUTO: 0 K/UL
NRBC BLD-RTO: 0 /100 WBC (ref 0–0.2)
PLATELET # BLD AUTO: 263 K/UL (ref 164–446)
PMV BLD AUTO: 11.1 FL (ref 9–12.9)
RBC # BLD AUTO: 4.38 M/UL (ref 4.2–5.4)
WBC # BLD AUTO: 4.3 K/UL (ref 4.8–10.8)

## 2024-09-23 PROCEDURE — 84443 ASSAY THYROID STIM HORMONE: CPT

## 2024-09-23 PROCEDURE — 80061 LIPID PANEL: CPT

## 2024-09-23 PROCEDURE — 84439 ASSAY OF FREE THYROXINE: CPT

## 2024-09-23 PROCEDURE — 36415 COLL VENOUS BLD VENIPUNCTURE: CPT

## 2024-09-23 PROCEDURE — 83036 HEMOGLOBIN GLYCOSYLATED A1C: CPT

## 2024-09-23 PROCEDURE — 80053 COMPREHEN METABOLIC PANEL: CPT

## 2024-09-23 PROCEDURE — 85025 COMPLETE CBC W/AUTO DIFF WBC: CPT

## 2024-09-24 LAB
ALBUMIN SERPL BCP-MCNC: 4.2 G/DL (ref 3.2–4.9)
ALBUMIN/GLOB SERPL: 1.8 G/DL
ALP SERPL-CCNC: 60 U/L (ref 30–99)
ALT SERPL-CCNC: 18 U/L (ref 2–50)
ANION GAP SERPL CALC-SCNC: 12 MMOL/L (ref 7–16)
AST SERPL-CCNC: 21 U/L (ref 12–45)
BILIRUB SERPL-MCNC: 0.2 MG/DL (ref 0.1–1.5)
BUN SERPL-MCNC: 14 MG/DL (ref 8–22)
CALCIUM ALBUM COR SERPL-MCNC: 8.6 MG/DL (ref 8.5–10.5)
CALCIUM SERPL-MCNC: 8.8 MG/DL (ref 8.5–10.5)
CHLORIDE SERPL-SCNC: 108 MMOL/L (ref 96–112)
CHOLEST SERPL-MCNC: 165 MG/DL (ref 100–199)
CO2 SERPL-SCNC: 23 MMOL/L (ref 20–33)
CREAT SERPL-MCNC: 0.83 MG/DL (ref 0.5–1.4)
GFR SERPLBLD CREATININE-BSD FMLA CKD-EPI: 77 ML/MIN/1.73 M 2
GLOBULIN SER CALC-MCNC: 2.4 G/DL (ref 1.9–3.5)
GLUCOSE SERPL-MCNC: 100 MG/DL (ref 65–99)
HDLC SERPL-MCNC: 64 MG/DL
LDLC SERPL CALC-MCNC: 90 MG/DL
POTASSIUM SERPL-SCNC: 5 MMOL/L (ref 3.6–5.5)
PROT SERPL-MCNC: 6.6 G/DL (ref 6–8.2)
SODIUM SERPL-SCNC: 143 MMOL/L (ref 135–145)
T4 FREE SERPL-MCNC: 1.51 NG/DL (ref 0.93–1.7)
TRIGL SERPL-MCNC: 54 MG/DL (ref 0–149)
TSH SERPL-ACNC: 0.65 UIU/ML (ref 0.35–5.5)

## 2024-09-26 ENCOUNTER — OFFICE VISIT (OUTPATIENT)
Dept: MEDICAL GROUP | Facility: PHYSICIAN GROUP | Age: 69
End: 2024-09-26
Payer: MEDICARE

## 2024-09-26 VITALS
TEMPERATURE: 96.9 F | HEIGHT: 67 IN | HEART RATE: 65 BPM | BODY MASS INDEX: 35.03 KG/M2 | WEIGHT: 223.2 LBS | OXYGEN SATURATION: 96 % | SYSTOLIC BLOOD PRESSURE: 110 MMHG | DIASTOLIC BLOOD PRESSURE: 70 MMHG

## 2024-09-26 DIAGNOSIS — F32.5 MAJOR DEPRESSIVE DISORDER WITH SINGLE EPISODE, IN FULL REMISSION (HCC): ICD-10-CM

## 2024-09-26 DIAGNOSIS — E78.5 DYSLIPIDEMIA: ICD-10-CM

## 2024-09-26 DIAGNOSIS — E03.9 HYPOTHYROIDISM, UNSPECIFIED TYPE: ICD-10-CM

## 2024-09-26 DIAGNOSIS — R73.03 PREDIABETES: ICD-10-CM

## 2024-09-26 DIAGNOSIS — Z00.00 HEALTHCARE MAINTENANCE: ICD-10-CM

## 2024-09-26 PROCEDURE — 99214 OFFICE O/P EST MOD 30 MIN: CPT

## 2024-09-26 PROCEDURE — 3078F DIAST BP <80 MM HG: CPT

## 2024-09-26 PROCEDURE — 3074F SYST BP LT 130 MM HG: CPT

## 2024-09-26 ASSESSMENT — FIBROSIS 4 INDEX: FIB4 SCORE: 1.26

## 2024-09-26 ASSESSMENT — ENCOUNTER SYMPTOMS
DIZZINESS: 0
CONSTIPATION: 0
CHILLS: 0
SHORTNESS OF BREATH: 0
WEAKNESS: 0
ABDOMINAL PAIN: 0
FEVER: 0
MYALGIAS: 0
DIARRHEA: 0
BLURRED VISION: 0
COUGH: 0
VOMITING: 0
HEADACHES: 0
NAUSEA: 0
WEIGHT LOSS: 0

## 2024-09-27 NOTE — PROGRESS NOTES
"Verbal consent was acquired by the patient to use [employer] ambient listening note generation during this visit  Subjective:     CC: lab review    HPI:   Melissa Orellana presents today with  History of Present Illness  The patient presents for a routine checkup.    She reports a weight gain of 3 pounds since her return from a cruise on 08/05/2024. She is curious about the potential benefits of apple cider vinegar for weight loss. Despite not being an avid exerciser, she has recently taken up swimming.    She experiences morning nausea and ensures she consumes a hard-boiled egg before her swim to prevent this.    She has resumed her rosuvastatin medication and is also taking levothyroxine for Graves' disease. She has reduced her Zoloft dosage to 25 mg and plans to maintain this regimen for some time. She has discontinued the use of Macrobid.    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 She has been advised to discontinue all supplements two weeks prior to the surgery.      No problems updated.      ROS:  Review of Systems   Constitutional:  Negative for chills, fever, malaise/fatigue and weight loss.   Eyes:  Negative for blurred vision.   Respiratory:  Negative for cough and shortness of breath.    Cardiovascular:  Negative for chest pain.   Gastrointestinal:  Negative for abdominal pain, constipation, diarrhea, nausea and vomiting.   Musculoskeletal:  Negative for myalgias.   Neurological:  Negative for dizziness, weakness and headaches.       Objective:     Exam:  /70 (BP Location: Left arm, Patient Position: Sitting, BP Cuff Size: Adult)   Pulse 65   Temp 36.1 °C (96.9 °F) (Temporal)   Ht 1.702 m (5' 7\")   Wt 101 kg (223 lb 3.2 oz)   SpO2 96%   BMI 34.96 kg/m²  Body mass index is 34.96 kg/m².    Physical Exam  Constitutional:       General: She is not in acute distress.     Appearance: Normal appearance. She is not ill-appearing or toxic-appearing.   HENT:      " Head: Normocephalic.   Eyes:      Conjunctiva/sclera: Conjunctivae normal.   Cardiovascular:      Rate and Rhythm: Normal rate and regular rhythm.      Pulses: Normal pulses.      Heart sounds: Normal heart sounds. No murmur heard.  Pulmonary:      Effort: Pulmonary effort is normal. No respiratory distress.      Breath sounds: Normal breath sounds.   Skin:     General: Skin is warm and dry.   Neurological:      General: No focal deficit present.      Mental Status: She is alert and oriented to person, place, and time.   Psychiatric:         Mood and Affect: Mood normal.         Behavior: Behavior normal.         Labs:   Hospital Outpatient Visit on 09/23/2024   Component Date Value    WBC 09/23/2024 4.3 (L)     RBC 09/23/2024 4.38     Hemoglobin 09/23/2024 13.1     Hematocrit 09/23/2024 42.2     MCV 09/23/2024 96.3     MCH 09/23/2024 29.9     MCHC 09/23/2024 31.0 (L)     RDW 09/23/2024 47.7     Platelet Count 09/23/2024 263     MPV 09/23/2024 11.1     Neutrophils-Polys 09/23/2024 56.10     Lymphocytes 09/23/2024 30.70     Monocytes 09/23/2024 10.20     Eosinophils 09/23/2024 2.30     Basophils 09/23/2024 0.50     Immature Granulocytes 09/23/2024 0.20     Nucleated RBC 09/23/2024 0.00     Neutrophils (Absolute) 09/23/2024 2.43     Lymphs (Absolute) 09/23/2024 1.33     Monos (Absolute) 09/23/2024 0.44     Eos (Absolute) 09/23/2024 0.10     Baso (Absolute) 09/23/2024 0.02     Immature Granulocytes (a* 09/23/2024 0.01     NRBC (Absolute) 09/23/2024 0.00     Cholesterol,Tot 09/23/2024 165     Triglycerides 09/23/2024 54     HDL 09/23/2024 64     LDL 09/23/2024 90     Sodium 09/23/2024 143     Potassium 09/23/2024 5.0     Chloride 09/23/2024 108     Co2 09/23/2024 23     Anion Gap 09/23/2024 12.0     Glucose 09/23/2024 100 (H)     Bun 09/23/2024 14     Creatinine 09/23/2024 0.83     Calcium 09/23/2024 8.8     Correct Calcium 09/23/2024 8.6     AST(SGOT) 09/23/2024 21     ALT(SGPT) 09/23/2024 18     Alkaline Phosphatase  09/23/2024 60     Total Bilirubin 09/23/2024 0.2     Albumin 09/23/2024 4.2     Total Protein 09/23/2024 6.6     Globulin 09/23/2024 2.4     A-G Ratio 09/23/2024 1.8     Glycohemoglobin 09/23/2024 5.6     Est Avg Glucose 09/23/2024 114     TSH 09/23/2024 0.646     Free T-4 09/23/2024 1.51     GFR (CKD-EPI) 09/23/2024 77        Assessment & Plan: Medical Decision Making     69 y.o. female with the following -   Assessment & Plan  1. History of Prediabetes.  Her A1C remains stable at 5.6, indicating she is no longer in the prediabetic range. She should continue her current lifestyle and dietary habits to maintain this status.    2. Hyperlipidemia.  Her cholesterol levels have significantly improved with the use of rosuvastatin. Total cholesterol is now 165, down from 264, and LDL is 90, down from 189. She should continue taking rosuvastatin 5 mg.     3. Hypothyroidism.  Her thyroid function is stable on levothyroxine. She should continue taking levothyroxine 100 mcg each morning. Thyroid levels will be monitored annually unless symptoms change.    4. Depression.  Her mood is stable on Zoloft 25 mg. She should continue this medication.    5. Health Maintenance.  She will have labs done after October 12 for her upcoming blepharoplasty surgery in November. No additional labs are needed until next year.        Problem List Items Addressed This Visit       Dyslipidemia    Relevant Orders    Comp Metabolic Panel    Lipid Profile    Hypothyroidism    Relevant Orders    CBC WITH DIFFERENTIAL    TSH+FREE T4    Major depressive disorder with single episode, in full remission (HCC)    Relevant Orders    Comp Metabolic Panel    Prediabetes    Relevant Orders    Comp Metabolic Panel    HEMOGLOBIN A1C     Other Visit Diagnoses       Healthcare maintenance        Relevant Orders    Comp Metabolic Panel    Lipid Profile    CBC WITH DIFFERENTIAL    HEMOGLOBIN A1C    TSH+FREE T4            Differential diagnosis, natural history,  supportive care, and indications for immediate follow-up discussed.  Shared decision making approach utilized, and patient is amendable with plan of care.  Patient understands to return to clinic or go to the emergency department if symptoms worsen. All questions and concerns addressed to the best of my knowledge.    Return in about 6 months (around 3/26/2025), or if symptoms worsen or fail to improve.    Please note that this dictation was created using voice recognition software. I have made every reasonable attempt to correct obvious errors, but I expect that there are errors of grammar and possibly content that I did not discover before finalizing the note.

## 2024-10-18 ENCOUNTER — HOSPITAL ENCOUNTER (OUTPATIENT)
Dept: LAB | Facility: MEDICAL CENTER | Age: 69
End: 2024-10-18
Attending: OPHTHALMOLOGY
Payer: MEDICARE

## 2024-10-18 PROCEDURE — 36415 COLL VENOUS BLD VENIPUNCTURE: CPT

## 2024-10-18 PROCEDURE — 80048 BASIC METABOLIC PNL TOTAL CA: CPT

## 2024-10-19 LAB
ANION GAP SERPL CALC-SCNC: 16 MMOL/L (ref 7–16)
BUN SERPL-MCNC: 16 MG/DL (ref 8–22)
CALCIUM SERPL-MCNC: 9.7 MG/DL (ref 8.5–10.5)
CHLORIDE SERPL-SCNC: 103 MMOL/L (ref 96–112)
CO2 SERPL-SCNC: 22 MMOL/L (ref 20–33)
CREAT SERPL-MCNC: 0.72 MG/DL (ref 0.5–1.4)
GFR SERPLBLD CREATININE-BSD FMLA CKD-EPI: 91 ML/MIN/1.73 M 2
GLUCOSE SERPL-MCNC: 96 MG/DL (ref 65–99)
POTASSIUM SERPL-SCNC: 5.2 MMOL/L (ref 3.6–5.5)
SODIUM SERPL-SCNC: 141 MMOL/L (ref 135–145)

## 2024-10-22 ENCOUNTER — OFFICE VISIT (OUTPATIENT)
Dept: MEDICAL GROUP | Facility: PHYSICIAN GROUP | Age: 69
End: 2024-10-22
Payer: MEDICARE

## 2024-10-22 VITALS
TEMPERATURE: 97.1 F | DIASTOLIC BLOOD PRESSURE: 82 MMHG | HEIGHT: 67 IN | OXYGEN SATURATION: 98 % | SYSTOLIC BLOOD PRESSURE: 130 MMHG | HEART RATE: 72 BPM | WEIGHT: 223 LBS | BODY MASS INDEX: 35 KG/M2

## 2024-10-22 DIAGNOSIS — Z01.818 PRE-OPERATIVE EXAM: ICD-10-CM

## 2024-10-22 PROCEDURE — 3079F DIAST BP 80-89 MM HG: CPT

## 2024-10-22 PROCEDURE — 99213 OFFICE O/P EST LOW 20 MIN: CPT

## 2024-10-22 PROCEDURE — 3075F SYST BP GE 130 - 139MM HG: CPT

## 2024-10-22 ASSESSMENT — ENCOUNTER SYMPTOMS
HEADACHES: 0
COUGH: 0
ABDOMINAL PAIN: 0
WEAKNESS: 0
NAUSEA: 0
CHILLS: 0
DIARRHEA: 0
BLURRED VISION: 0
SHORTNESS OF BREATH: 0
FEVER: 0
MYALGIAS: 0
CONSTIPATION: 0
WEIGHT LOSS: 0
VOMITING: 0
DIZZINESS: 0

## 2024-10-22 ASSESSMENT — FIBROSIS 4 INDEX: FIB4 SCORE: 1.26

## 2024-12-28 DIAGNOSIS — E78.5 DYSLIPIDEMIA: ICD-10-CM

## 2024-12-30 RX ORDER — ROSUVASTATIN CALCIUM 5 MG/1
5 TABLET, COATED ORAL EVERY EVENING
Qty: 100 TABLET | Refills: 3 | Status: SHIPPED | OUTPATIENT
Start: 2024-12-30

## 2024-12-30 NOTE — TELEPHONE ENCOUNTER
Received request via: Pharmacy    Was the patient seen in the last year in this department? Yes    Does the patient have an active prescription (recently filled or refills available) for medication(s) requested? No    Pharmacy Name:     Does the patient have [facility] Plus and need 100-day supply? (This applies to ALL medications) Yes, quantity updated to 100 days

## 2025-01-10 ENCOUNTER — HOSPITAL ENCOUNTER (OUTPATIENT)
Dept: RADIOLOGY | Facility: MEDICAL CENTER | Age: 70
End: 2025-01-10
Payer: MEDICARE

## 2025-01-10 DIAGNOSIS — Z12.31 VISIT FOR SCREENING MAMMOGRAM: ICD-10-CM

## 2025-01-10 PROCEDURE — 77067 SCR MAMMO BI INCL CAD: CPT

## 2025-04-03 DIAGNOSIS — Z29.89 NEED FOR MALARIA PROPHYLAXIS: ICD-10-CM

## 2025-04-03 RX ORDER — ATOVAQUONE AND PROGUANIL HYDROCHLORIDE 250; 100 MG/1; MG/1
TABLET, FILM COATED ORAL
Qty: 18 TABLET | Refills: 0 | Status: SHIPPED | OUTPATIENT
Start: 2025-04-03

## 2025-04-17 ENCOUNTER — OFFICE VISIT (OUTPATIENT)
Dept: MEDICAL GROUP | Facility: PHYSICIAN GROUP | Age: 70
End: 2025-04-17
Payer: MEDICARE

## 2025-04-17 VITALS
TEMPERATURE: 98.3 F | DIASTOLIC BLOOD PRESSURE: 80 MMHG | OXYGEN SATURATION: 97 % | HEART RATE: 71 BPM | WEIGHT: 214.2 LBS | SYSTOLIC BLOOD PRESSURE: 130 MMHG | BODY MASS INDEX: 33.62 KG/M2 | HEIGHT: 67 IN

## 2025-04-17 DIAGNOSIS — G89.29 CHRONIC LEFT-SIDED LOW BACK PAIN WITHOUT SCIATICA: ICD-10-CM

## 2025-04-17 DIAGNOSIS — Z23 NEED FOR VACCINATION: ICD-10-CM

## 2025-04-17 DIAGNOSIS — M79.604 ACUTE LEG PAIN, RIGHT: ICD-10-CM

## 2025-04-17 DIAGNOSIS — M54.50 CHRONIC LEFT-SIDED LOW BACK PAIN WITHOUT SCIATICA: ICD-10-CM

## 2025-04-17 DIAGNOSIS — G47.09 OTHER INSOMNIA: ICD-10-CM

## 2025-04-17 DIAGNOSIS — F32.5 MAJOR DEPRESSIVE DISORDER WITH SINGLE EPISODE, IN FULL REMISSION (HCC): ICD-10-CM

## 2025-04-17 PROCEDURE — 3079F DIAST BP 80-89 MM HG: CPT

## 2025-04-17 PROCEDURE — 90715 TDAP VACCINE 7 YRS/> IM: CPT

## 2025-04-17 PROCEDURE — 90471 IMMUNIZATION ADMIN: CPT

## 2025-04-17 PROCEDURE — 3075F SYST BP GE 130 - 139MM HG: CPT

## 2025-04-17 PROCEDURE — 99214 OFFICE O/P EST MOD 30 MIN: CPT | Mod: 25

## 2025-04-17 RX ORDER — MELOXICAM 15 MG/1
15 TABLET ORAL DAILY
Qty: 30 TABLET | Refills: 0 | Status: SHIPPED | OUTPATIENT
Start: 2025-04-17

## 2025-04-17 RX ORDER — TRAZODONE HYDROCHLORIDE 50 MG/1
50 TABLET ORAL NIGHTLY PRN
Qty: 30 TABLET | Refills: 1 | Status: SHIPPED | OUTPATIENT
Start: 2025-04-17

## 2025-04-17 RX ORDER — SERTRALINE HYDROCHLORIDE 25 MG/1
25 TABLET, FILM COATED ORAL DAILY
Qty: 90 TABLET | Refills: 3 | Status: SHIPPED | OUTPATIENT
Start: 2025-04-17

## 2025-04-17 RX ORDER — GABAPENTIN 100 MG/1
100 CAPSULE ORAL
Qty: 60 CAPSULE | Refills: 1 | Status: SHIPPED | OUTPATIENT
Start: 2025-04-17

## 2025-04-17 ASSESSMENT — ENCOUNTER SYMPTOMS
NAUSEA: 0
WEIGHT LOSS: 0
COUGH: 0
VOMITING: 0
DIZZINESS: 0
MYALGIAS: 0
WEAKNESS: 0
HEADACHES: 0
BLURRED VISION: 0
SHORTNESS OF BREATH: 0
DIARRHEA: 0
CONSTIPATION: 0
FEVER: 0
CHILLS: 0
ABDOMINAL PAIN: 0
BACK PAIN: 1

## 2025-04-17 ASSESSMENT — PATIENT HEALTH QUESTIONNAIRE - PHQ9
SUM OF ALL RESPONSES TO PHQ QUESTIONS 1-9: 0
4. FEELING TIRED OR HAVING LITTLE ENERGY: NOT AT ALL
SUM OF ALL RESPONSES TO PHQ9 QUESTIONS 1 AND 2: 0
5. POOR APPETITE OR OVEREATING: NOT AT ALL
6. FEELING BAD ABOUT YOURSELF - OR THAT YOU ARE A FAILURE OR HAVE LET YOURSELF OR YOUR FAMILY DOWN: NOT AL ALL
9. THOUGHTS THAT YOU WOULD BE BETTER OFF DEAD, OR OF HURTING YOURSELF: NOT AT ALL
2. FEELING DOWN, DEPRESSED, IRRITABLE, OR HOPELESS: NOT AT ALL
7. TROUBLE CONCENTRATING ON THINGS, SUCH AS READING THE NEWSPAPER OR WATCHING TELEVISION: NOT AT ALL
1. LITTLE INTEREST OR PLEASURE IN DOING THINGS: NOT AT ALL
3. TROUBLE FALLING OR STAYING ASLEEP OR SLEEPING TOO MUCH: NOT AT ALL
8. MOVING OR SPEAKING SO SLOWLY THAT OTHER PEOPLE COULD HAVE NOTICED. OR THE OPPOSITE, BEING SO FIGETY OR RESTLESS THAT YOU HAVE BEEN MOVING AROUND A LOT MORE THAN USUAL: NOT AT ALL

## 2025-04-17 ASSESSMENT — FIBROSIS 4 INDEX: FIB4 SCORE: 1.26

## 2025-04-17 NOTE — PROGRESS NOTES
Verbal consent was acquired by the patient to use [employer] ambient listening note generation during this visit  Subjective:     CC:  Diagnoses of Need for vaccination, Acute leg pain, right, Chronic left-sided low back pain without sciatica, Major depressive disorder with single episode, in full remission (HCC), and Other insomnia were pertinent to this visit.    HISTORY OF THE PRESENT ILLNESS: Patient is a 69 y.o. female.   No problems updated.    History of Present Illness  The patient presents for evaluation of dorsalgia, right lower extremity pain, and medication management.    Dorsalgia  - The patient reports recurrent episodes of dorsalgia over the past few years, typically precipitated by frequent bending activities such as vacuuming.  - The pain generally resolves within two weeks.  - A recent exacerbation of left-sided dorsalgia occurred while vacuuming.  - No previous medical attention has been sought for this issue, but concern has increased due to upcoming travel plans.  - There have been no recent prolonged car rides or airline travel, and no history of thromboembolic events.  - Sciatica symptoms are not reported.    Right Lower Extremity Pain  - Approximately one week ago, the patient experienced severe pain in the right lower extremity, rendering her unable to ambulate.  - The pain, described as an ache, radiated from the hamstring region distally.  - Despite some improvement, the patient continues to limp and experiences discomfort even when seated with legs elevated.  - No sharp pain, erythema, or edema in the calf is reported.  - The patient has been favoring the left knee for the past month due to persistent issues.  - An appointment with Dr. Munguia is scheduled for Monday.  - The patient seeks a more effective analgesic than ibuprofen, as ibuprofen has been ineffective and disruptive to sleep.  - She has been taking two ibuprofen tablets every four hours for the past two weeks.    Supplemental  "information: Current medications include sertraline 25 mg and levothyroxine, both requiring refills. The sertraline dosage has been reduced to 25 mg, but discontinuation is not desired. A 24-hour flight to [place] is planned, and the patient requests assistance with sleep during the flight. Antimalarial medication has been prescribed, and concerns about potential adverse effects are expressed.    ROS:   Review of Systems   Constitutional:  Negative for chills, fever, malaise/fatigue and weight loss.   Eyes:  Negative for blurred vision.   Respiratory:  Negative for cough and shortness of breath.    Cardiovascular:  Negative for chest pain.   Gastrointestinal:  Negative for abdominal pain, constipation, diarrhea, nausea and vomiting.   Musculoskeletal:  Positive for back pain and joint pain. Negative for myalgias.   Neurological:  Negative for dizziness, weakness and headaches.         Objective:     Exam: /80 (BP Location: Left arm, Patient Position: Sitting, BP Cuff Size: Adult)   Pulse 71   Temp 36.8 °C (98.3 °F) (Temporal)   Ht 1.702 m (5' 7\")   Wt 97.2 kg (214 lb 3.2 oz)   SpO2 97%  Body mass index is 33.55 kg/m².    Physical Exam  Constitutional:       General: She is not in acute distress.     Appearance: Normal appearance. She is not ill-appearing or toxic-appearing.   HENT:      Head: Normocephalic.   Eyes:      Conjunctiva/sclera: Conjunctivae normal.   Pulmonary:      Effort: Pulmonary effort is normal.   Musculoskeletal:         General: Swelling (mild RLE) and tenderness (Right lower calf) present.      Cervical back: No tenderness.   Skin:     General: Skin is warm and dry.   Neurological:      General: No focal deficit present.      Mental Status: She is alert and oriented to person, place, and time.   Psychiatric:         Mood and Affect: Mood normal.         Behavior: Behavior normal.         Labs:   No visits with results within 1 Month(s) from this visit.   Latest known visit with results " is:   Hospital Outpatient Visit on 10/18/2024   Component Date Value   • Sodium 10/18/2024 141    • Potassium 10/18/2024 5.2    • Chloride 10/18/2024 103    • Co2 10/18/2024 22    • Glucose 10/18/2024 96    • Bun 10/18/2024 16    • Creatinine 10/18/2024 0.72    • Calcium 10/18/2024 9.7    • Anion Gap 10/18/2024 16.0    • GFR (CKD-EPI) 10/18/2024 91          Assessment & Plan: Medical Decision Making   69 y.o. female with the following -    Assessment & Plan  1. Back pain.  - The etiology of the back pain could be attributed to degenerative disc disease or arthritis, potentially impinging on the nerves and causing radiating pain.  - An x-ray of the lumbar spine will be ordered.  - A referral to a physiatrist will be made for further evaluation and management.  - The patient is advised to avoid prolonged sitting and take frequent breaks during travel.    2. Right leg pain.  - The right leg pain may be a result of a hamstring injury or a blood clot, although it could also be related to her lower back issues.  - An ultrasound of the right lower extremity will be ordered to rule out deep vein thrombosis (DVT).  - She is advised to take breaks during her 17-hour drive, walk frequently, and avoid prolonged sitting. The use of compression socks and a daily dose of 81 mg aspirin is recommended during travel.  - A prescription for meloxicam 15 mg once daily and gabapentin 200 mg nightly as needed will be provided. She is advised to start with 100 mg of gabapentin and increase to 200 mg if necessary.    3. Medication management.  - A refill for sertraline will be sent to [facility].  - The patient will continue her current dosage of levothyroxine, which has one more refill until 07/2025.    4. Health maintenance.  - She will receive a Tdap vaccine today.  - The patient is advised to wear compression socks during travel and take an 81 mg aspirin daily to prevent blood clots.    5. Sleep management.  - A prescription for  trazodone 50 mg, to be taken as half a tablet at bedtime if needed, will be provided.  - The patient is advised to try the medication before her trip to ensure it works well for her.      Problem List Items Addressed This Visit       Major depressive disorder with single episode, in full remission (HCC)    Relevant Medications    sertraline (ZOLOFT) 25 MG tablet    traZODone (DESYREL) 50 MG Tab     Other Visit Diagnoses         Need for vaccination        Relevant Orders    Tdap Vaccine =>8YO IM (Completed)      Acute leg pain, right        Relevant Medications    meloxicam (MOBIC) 15 MG tablet    gabapentin (NEURONTIN) 100 MG Cap    Other Relevant Orders    DX-LUMBAR SPINE-2 OR 3 VIEWS    US-EXTREMITY VENOUS LOWER UNILAT RIGHT      Chronic left-sided low back pain without sciatica        Relevant Medications    meloxicam (MOBIC) 15 MG tablet    gabapentin (NEURONTIN) 100 MG Cap    sertraline (ZOLOFT) 25 MG tablet    traZODone (DESYREL) 50 MG Tab    Other Relevant Orders    DX-LUMBAR SPINE-2 OR 3 VIEWS    Referral to Pain Clinic      Other insomnia        Relevant Medications    traZODone (DESYREL) 50 MG Tab            Differential diagnosis, natural history, supportive care, and indications for immediate follow-up discussed.  Shared decision making approach was utilized, and patient is amendable with plan of care.  Patient understands to return to clinic or go to the emergency department if symptoms worsen. All questions and concerns addressed to the best of my knowledge.      Return in about 3 months (around 7/17/2025), or if symptoms worsen or fail to improve.    Please note that this dictation was created using voice recognition software. I have made every reasonable attempt to correct obvious errors, but I expect that there are errors of grammar and possibly content that I did not discover before finalizing the note.

## 2025-04-18 ENCOUNTER — HOSPITAL ENCOUNTER (OUTPATIENT)
Dept: RADIOLOGY | Facility: MEDICAL CENTER | Age: 70
End: 2025-04-18
Payer: MEDICARE

## 2025-04-18 DIAGNOSIS — M79.604 ACUTE LEG PAIN, RIGHT: ICD-10-CM

## 2025-04-18 PROCEDURE — 93971 EXTREMITY STUDY: CPT | Mod: RT

## 2025-04-21 ENCOUNTER — TELEPHONE (OUTPATIENT)
Dept: HEALTH INFORMATION MANAGEMENT | Facility: OTHER | Age: 70
End: 2025-04-21
Payer: MEDICARE

## 2025-04-21 ENCOUNTER — RESULTS FOLLOW-UP (OUTPATIENT)
Dept: MEDICAL GROUP | Facility: PHYSICIAN GROUP | Age: 70
End: 2025-04-21

## 2025-05-15 DIAGNOSIS — M79.604 ACUTE LEG PAIN, RIGHT: ICD-10-CM

## 2025-05-15 RX ORDER — MELOXICAM 15 MG/1
15 TABLET ORAL DAILY
Qty: 30 TABLET | Refills: 0 | Status: SHIPPED | OUTPATIENT
Start: 2025-05-15

## 2025-05-15 NOTE — TELEPHONE ENCOUNTER
Received request via: Patient    Was the patient seen in the last year in this department? Yes    Does the patient have an active prescription (recently filled or refills available) for medication(s) requested? No    Pharmacy Name: [id number] - [place], [place] - [address] AT [facility] [address]     Does the patient have [facility] Plus and need 100-day supply? (This applies to ALL medications) Patient does not have SCP

## 2025-06-15 DIAGNOSIS — G47.09 OTHER INSOMNIA: ICD-10-CM

## 2025-06-16 RX ORDER — TRAZODONE HYDROCHLORIDE 50 MG/1
50 TABLET ORAL NIGHTLY PRN
Qty: 30 TABLET | Refills: 0 | Status: SHIPPED | OUTPATIENT
Start: 2025-06-16

## 2025-06-16 NOTE — TELEPHONE ENCOUNTER
Received request via: Pharmacy    Was the patient seen in the last year in this department? Yes    Does the patient have an active prescription (recently filled or refills available) for medication(s) requested? No    Pharmacy Name: [facility]    Does the patient have [facility] Plus and need 100-day supply? (This applies to ALL medications) Patient does have SCP

## 2025-07-07 RX ORDER — LEVOTHYROXINE SODIUM 100 UG/1
100 TABLET ORAL
Qty: 100 TABLET | Refills: 0 | Status: SHIPPED | OUTPATIENT
Start: 2025-07-07

## 2025-07-07 NOTE — TELEPHONE ENCOUNTER
Received request via: Pharmacy    Was the patient seen in the last year in this department? Yes    Does the patient have an active prescription (recently filled or refills available) for medication(s) requested? No    Pharmacy Name: [facility]    Does the patient have [facility] Plus and need 100-day supply? (This applies to ALL medications) Patient does not have SCP

## 2025-07-14 DIAGNOSIS — G47.09 OTHER INSOMNIA: ICD-10-CM

## 2025-07-14 RX ORDER — TRAZODONE HYDROCHLORIDE 50 MG/1
50 TABLET ORAL NIGHTLY PRN
Qty: 100 TABLET | Refills: 1 | Status: SHIPPED | OUTPATIENT
Start: 2025-07-14

## 2025-07-14 NOTE — TELEPHONE ENCOUNTER
Received request via: Pharmacy    Was the patient seen in the last year in this department? Yes    Does the patient have an active prescription (recently filled or refills available) for medication(s) requested? No    Pharmacy Name:     Does the patient have [facility] Plus and need 100-day supply? (This applies to ALL medications) Yes, quantity updated to 100 days

## 2025-08-13 DIAGNOSIS — M79.604 ACUTE LEG PAIN, RIGHT: ICD-10-CM

## 2025-08-13 RX ORDER — GABAPENTIN 100 MG/1
CAPSULE ORAL
Qty: 100 CAPSULE | Refills: 1 | Status: SHIPPED | OUTPATIENT
Start: 2025-08-13